# Patient Record
Sex: MALE | Race: WHITE | Employment: FULL TIME | URBAN - METROPOLITAN AREA
[De-identification: names, ages, dates, MRNs, and addresses within clinical notes are randomized per-mention and may not be internally consistent; named-entity substitution may affect disease eponyms.]

---

## 2018-10-18 ENCOUNTER — HOSPITAL ENCOUNTER (EMERGENCY)
Age: 23
Discharge: HOME | End: 2018-10-18
Payer: COMMERCIAL

## 2018-10-18 VITALS
DIASTOLIC BLOOD PRESSURE: 82 MMHG | SYSTOLIC BLOOD PRESSURE: 118 MMHG | HEART RATE: 112 BPM | OXYGEN SATURATION: 96 % | RESPIRATION RATE: 16 BRPM | BODY MASS INDEX: 31.34 KG/M2 | TEMPERATURE: 98.3 F | HEIGHT: 66 IN | WEIGHT: 195 LBS

## 2018-10-18 DIAGNOSIS — J01.00 ACUTE NON-RECURRENT MAXILLARY SINUSITIS: Primary | ICD-10-CM

## 2018-10-18 DIAGNOSIS — H10.32 ACUTE BACTERIAL CONJUNCTIVITIS OF LEFT EYE: ICD-10-CM

## 2018-10-18 PROCEDURE — 99282 EMERGENCY DEPT VISIT SF MDM: CPT

## 2018-10-18 RX ORDER — BENZONATATE 100 MG/1
100 CAPSULE ORAL 3 TIMES DAILY PRN
Qty: 21 CAPSULE | Refills: 0 | Status: SHIPPED | OUTPATIENT
Start: 2018-10-18 | End: 2018-10-25

## 2018-10-18 RX ORDER — MULTIVIT WITH MINERALS/LUTEIN
250 TABLET ORAL DAILY
COMMUNITY
End: 2019-04-06 | Stop reason: ALTCHOICE

## 2018-10-18 RX ORDER — DOXYCYCLINE HYCLATE 100 MG
100 TABLET ORAL 2 TIMES DAILY
Qty: 20 TABLET | Refills: 0 | Status: SHIPPED | OUTPATIENT
Start: 2018-10-18 | End: 2018-10-28

## 2018-10-18 RX ORDER — GUAIFENESIN 400 MG/1
400 TABLET ORAL 4 TIMES DAILY PRN
COMMUNITY
End: 2019-04-06 | Stop reason: ALTCHOICE

## 2018-10-18 RX ORDER — POLYMYXIN B SULFATE AND TRIMETHOPRIM 1; 10000 MG/ML; [USP'U]/ML
1 SOLUTION OPHTHALMIC EVERY 4 HOURS
Qty: 1 BOTTLE | Refills: 0 | Status: SHIPPED | OUTPATIENT
Start: 2018-10-18 | End: 2018-10-28

## 2018-10-18 ASSESSMENT — PAIN DESCRIPTION - DESCRIPTORS: DESCRIPTORS: ACHING

## 2018-10-18 ASSESSMENT — PAIN SCALES - GENERAL: PAINLEVEL_OUTOF10: 5

## 2018-10-18 ASSESSMENT — PAIN DESCRIPTION - PAIN TYPE: TYPE: ACUTE PAIN

## 2018-10-18 ASSESSMENT — PAIN DESCRIPTION - FREQUENCY: FREQUENCY: CONTINUOUS

## 2019-04-06 ENCOUNTER — HOSPITAL ENCOUNTER (EMERGENCY)
Age: 24
Discharge: HOME | End: 2019-04-07
Attending: EMERGENCY MEDICINE
Payer: COMMERCIAL

## 2019-04-06 ENCOUNTER — APPOINTMENT (OUTPATIENT)
Dept: GENERAL RADIOLOGY | Age: 24
End: 2019-04-06
Payer: COMMERCIAL

## 2019-04-06 ENCOUNTER — HOSPITAL ENCOUNTER (OUTPATIENT)
Age: 24
Discharge: HOME | End: 2019-04-06
Payer: COMMERCIAL

## 2019-04-06 DIAGNOSIS — S82.832A OTHER CLOSED FRACTURE OF DISTAL END OF LEFT FIBULA, INITIAL ENCOUNTER: Primary | ICD-10-CM

## 2019-04-06 PROCEDURE — A0428 BLS: HCPCS

## 2019-04-06 PROCEDURE — 73610 X-RAY EXAM OF ANKLE: CPT

## 2019-04-06 PROCEDURE — A0425 GROUND MILEAGE: HCPCS

## 2019-04-06 PROCEDURE — 6370000000 HC RX 637 (ALT 250 FOR IP): Performed by: NURSE PRACTITIONER

## 2019-04-06 PROCEDURE — 99285 EMERGENCY DEPT VISIT HI MDM: CPT

## 2019-04-06 PROCEDURE — 29515 APPLICATION SHORT LEG SPLINT: CPT

## 2019-04-06 PROCEDURE — 73590 X-RAY EXAM OF LOWER LEG: CPT

## 2019-04-06 RX ORDER — IBUPROFEN 800 MG/1
800 TABLET ORAL ONCE
Status: COMPLETED | OUTPATIENT
Start: 2019-04-06 | End: 2019-04-06

## 2019-04-06 RX ORDER — OXYCODONE HYDROCHLORIDE AND ACETAMINOPHEN 5; 325 MG/1; MG/1
1 TABLET ORAL ONCE
Status: COMPLETED | OUTPATIENT
Start: 2019-04-06 | End: 2019-04-06

## 2019-04-06 RX ORDER — LIDOCAINE HYDROCHLORIDE 10 MG/ML
20 INJECTION, SOLUTION EPIDURAL; INFILTRATION; INTRACAUDAL; PERINEURAL SEE ADMIN INSTRUCTIONS
Status: DISCONTINUED | OUTPATIENT
Start: 2019-04-06 | End: 2019-04-07 | Stop reason: HOSPADM

## 2019-04-06 RX ADMIN — IBUPROFEN 800 MG: 800 TABLET, FILM COATED ORAL at 20:31

## 2019-04-06 RX ADMIN — OXYCODONE HYDROCHLORIDE AND ACETAMINOPHEN 1 TABLET: 5; 325 TABLET ORAL at 20:31

## 2019-04-06 ASSESSMENT — PAIN DESCRIPTION - LOCATION
LOCATION: ANKLE
LOCATION: LEG

## 2019-04-06 ASSESSMENT — PAIN DESCRIPTION - DESCRIPTORS
DESCRIPTORS: DISCOMFORT
DESCRIPTORS: DISCOMFORT

## 2019-04-06 ASSESSMENT — PAIN DESCRIPTION - ORIENTATION
ORIENTATION: LEFT
ORIENTATION: LEFT

## 2019-04-06 ASSESSMENT — PAIN DESCRIPTION - PAIN TYPE: TYPE: ACUTE PAIN

## 2019-04-06 ASSESSMENT — PAIN SCALES - GENERAL
PAINLEVEL_OUTOF10: 10
PAINLEVEL_OUTOF10: 2
PAINLEVEL_OUTOF10: 10

## 2019-04-06 NOTE — LETTER
[address] [facility] Emergency Department  [address] [address]  Phone: [phone]             April 7, 2019    Patient: Liv Coles   YOB: 1995   Date of Visit: 4/6/2019       To Whom It May Concern: Liv Coles was seen and treated in our emergency department on 4/6/2019. He may return to Work on the following date 4/8/19 with light duty. He may return to full duty once he is cleared by orthopedics.     Sincerely,       Sonido Andrade RN         Signature:__________________________________

## 2019-04-07 ENCOUNTER — APPOINTMENT (OUTPATIENT)
Dept: GENERAL RADIOLOGY | Age: 24
End: 2019-04-07
Payer: COMMERCIAL

## 2019-04-07 VITALS
HEIGHT: 66 IN | TEMPERATURE: 97.9 F | OXYGEN SATURATION: 100 % | BODY MASS INDEX: 32.14 KG/M2 | HEART RATE: 80 BPM | DIASTOLIC BLOOD PRESSURE: 80 MMHG | WEIGHT: 200 LBS | SYSTOLIC BLOOD PRESSURE: 136 MMHG | RESPIRATION RATE: 15 BRPM

## 2019-04-07 PROCEDURE — 6370000000 HC RX 637 (ALT 250 FOR IP): Performed by: STUDENT IN AN ORGANIZED HEALTH CARE EDUCATION/TRAINING PROGRAM

## 2019-04-07 PROCEDURE — 73610 X-RAY EXAM OF ANKLE: CPT

## 2019-04-07 RX ORDER — OXYCODONE HYDROCHLORIDE AND ACETAMINOPHEN 5; 325 MG/1; MG/1
1 TABLET ORAL EVERY 6 HOURS PRN
Status: DISCONTINUED | OUTPATIENT
Start: 2019-04-07 | End: 2019-04-07 | Stop reason: HOSPADM

## 2019-04-07 RX ORDER — HYDROCODONE BITARTRATE AND ACETAMINOPHEN 5; 325 MG/1; MG/1
1 TABLET ORAL EVERY 4 HOURS PRN
Qty: 18 TABLET | Refills: 0 | Status: SHIPPED | OUTPATIENT
Start: 2019-04-07 | End: 2019-04-10

## 2019-04-07 RX ADMIN — OXYCODONE AND ACETAMINOPHEN 1 TABLET: 5; 325 TABLET ORAL at 03:27
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
## 2019-04-07 NOTE — ED NOTES
Attempted x 2 to call report to [facility] ED- placed on hold for extended periods of time with no answer by RN to give report to     Juan R Dumont RN  04/06/19 2572

## 2019-04-07 NOTE — ED NOTES
Pt alert and oriented x4. Speech clear. Respirations easy/unlabored. Skin warm/dry. Appropriate color. No signs of acute distress noted. Pt/family teaching provided; verbalized understanding. Pt stable for discharge.      Bhumi Lancaster RN  04/07/19 4703

## 2019-04-07 NOTE — ED PROVIDER NOTES
rebound, guarding, or rigidity. No pulsatile masses appreciated. Musculoskeletal: Moves all extremities x 4. Splint noted to left ankle  Neurologic: GCS 15, CN 2-12 grossly intact, no focal deficits, symmetric strength 5/5 in the upper and lower extremities bilaterally  Psych: Normal Affect    -------------------------------------------------- RESULTS -------------------------------------------------  I have personally reviewed all laboratory and imaging results for this patient. Results are listed below. LABS:  No results found for this visit on 04/06/19. RADIOLOGY:  Interpreted by Radiologist.  XR ANKLE LEFT (MIN 3 VIEWS)   Final Result   Acute distal fibular fracture. XR TIBIA FIBULA LEFT (2 VIEWS)   Final Result   Acute distal fibular fracture. EKG Interpretation        ------------------------- NURSING NOTES AND VITALS REVIEWED ---------------------------   The nursing notes within the ED encounter and vital signs as below have been reviewed by myself. BP (!) 154/90   Pulse 92   Temp 98.4 °F (36.9 °C)   Resp 16   Ht 5' 6\" (1.676 m)   Wt 200 lb (90.7 kg)   SpO2 95%   BMI 32.28 kg/m²   Oxygen Saturation Interpretation: Normal    The patients available past medical records and past encounters were reviewed. ------------------------------ ED COURSE/MEDICAL DECISION MAKING----------------------  Medications   nicotine (NICODERM CQ) 7 MG/24HR 1 patch (1 patch Transdermal Patch Applied 4/6/19 2146)   ibuprofen (ADVIL;MOTRIN) tablet 800 mg (800 mg Oral Given 4/6/19 2031)   oxyCODONE-acetaminophen (PERCOCET) 5-325 MG per tablet 1 tablet (1 tablet Oral Given 4/6/19 2031)             Medical Decision Making:        Re-Evaluations:             Re-evaluation. Patients symptoms show no change      Consultations:             ortho    Critical Care:          This patient's ED course included: a personal history and physicial eaxmination    This patient has been closely monitored during their ED course. Counseling: The emergency provider has spoken with the patient and discussed todays results, in addition to providing specific details for the plan of care and counseling regarding the diagnosis and prognosis. Questions are answered at this time and they are agreeable with the plan.       --------------------------------- IMPRESSION AND DISPOSITION ---------------------------------    IMPRESSION  1. Other closed fracture of distal end of left fibula, initial encounter        DISPOSITION  Disposition: Discharge to home  Patient condition is stable        NOTE: This report was transcribed using voice recognition software.  Every effort was made to ensure accuracy; however, inadvertent computerized transcription errors may be present         Kirstin Albert MD  04/06/19 100 Patria Sandra MD  04/07/19 5711
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
## 2019-04-09 ENCOUNTER — TELEPHONE (OUTPATIENT)
Dept: ADMINISTRATIVE | Age: 24
End: 2019-04-09

## 2019-04-09 DIAGNOSIS — S82.832A CLOSED FRACTURE OF DISTAL END OF LEFT FIBULA, UNSPECIFIED FRACTURE MORPHOLOGY, INITIAL ENCOUNTER: Primary | ICD-10-CM

## 2019-04-09 NOTE — TELEPHONE ENCOUNTER
Ed f/u appointment needed with Dr Vicente Mayes for closed fracture distal fibula. Call put through to [facility].

## 2019-04-09 NOTE — TELEPHONE ENCOUNTER
Patient's dad called checking on status of appointment with Dr Matt Olivera for closed fracture of distal end of left fibula. Doris Mcnamara and she states that  hasn't reviewed it yet, he is in surgery and she will call when she can. Please call Arlene Powell back to schedule, his phone number is [phone]. Thank you.

## 2019-04-16 ENCOUNTER — OFFICE VISIT (OUTPATIENT)
Dept: ORTHOPEDIC SURGERY | Age: 24
End: 2019-04-16
Payer: COMMERCIAL

## 2019-04-16 ENCOUNTER — HOSPITAL ENCOUNTER (OUTPATIENT)
Dept: GENERAL RADIOLOGY | Age: 24
Discharge: HOME | End: 2019-04-18
Payer: COMMERCIAL

## 2019-04-16 VITALS
SYSTOLIC BLOOD PRESSURE: 119 MMHG | DIASTOLIC BLOOD PRESSURE: 81 MMHG | BODY MASS INDEX: 30.61 KG/M2 | HEART RATE: 73 BPM | HEIGHT: 67 IN | WEIGHT: 195 LBS

## 2019-04-16 DIAGNOSIS — S82.832A CLOSED FRACTURE OF DISTAL END OF LEFT FIBULA, UNSPECIFIED FRACTURE MORPHOLOGY, INITIAL ENCOUNTER: Primary | ICD-10-CM

## 2019-04-16 DIAGNOSIS — S82.832A CLOSED FRACTURE OF DISTAL END OF LEFT FIBULA, UNSPECIFIED FRACTURE MORPHOLOGY, INITIAL ENCOUNTER: ICD-10-CM

## 2019-04-16 PROCEDURE — 99204 OFFICE O/P NEW MOD 45 MIN: CPT | Performed by: PHYSICIAN ASSISTANT

## 2019-04-16 PROCEDURE — 99212 OFFICE O/P EST SF 10 MIN: CPT | Performed by: ORTHOPAEDIC SURGERY

## 2019-04-16 PROCEDURE — 4004F PT TOBACCO SCREEN RCVD TLK: CPT | Performed by: PHYSICIAN ASSISTANT

## 2019-04-16 PROCEDURE — G8417 CALC BMI ABV UP PARAM F/U: HCPCS | Performed by: PHYSICIAN ASSISTANT

## 2019-04-16 PROCEDURE — G8427 DOCREV CUR MEDS BY ELIG CLIN: HCPCS | Performed by: PHYSICIAN ASSISTANT

## 2019-04-16 PROCEDURE — 73610 X-RAY EXAM OF ANKLE: CPT

## 2019-04-16 RX ORDER — HYDROCODONE BITARTRATE AND ACETAMINOPHEN 5; 325 MG/1; MG/1
1 TABLET ORAL EVERY 6 HOURS PRN
COMMUNITY
End: 2019-04-16 | Stop reason: CLARIF

## 2019-04-16 RX ORDER — ASPIRIN 325 MG
325 TABLET, DELAYED RELEASE (ENTERIC COATED) ORAL 2 TIMES DAILY
Qty: 60 TABLET | Refills: 2 | Status: SHIPPED
Start: 2019-04-16 | End: 2022-03-03 | Stop reason: ALTCHOICE

## 2019-04-16 RX ORDER — OXYCODONE HYDROCHLORIDE AND ACETAMINOPHEN 5; 325 MG/1; MG/1
1 TABLET ORAL EVERY 6 HOURS PRN
COMMUNITY
End: 2019-04-17

## 2019-04-16 RX ORDER — HYDROCODONE BITARTRATE AND ACETAMINOPHEN 5; 325 MG/1; MG/1
1 TABLET ORAL EVERY 4 HOURS PRN
Qty: 42 TABLET | Refills: 0 | Status: SHIPPED | OUTPATIENT
Start: 2019-04-16 | End: 2019-04-23

## 2019-04-16 NOTE — PATIENT INSTRUCTIONS
1. Your surgery is scheduled for Left Ankle Open Reduction Internal Fixation 4/19/19 at Time TBD at the [facility]ending [facility] to [facility] in [facility] . You will need to report to Preop area  that morning at 1.5 hours prior to scheduled surgery time    2. You are having outpatient surgery so you will be returning home the same day  3. Preadmission Testing (PAT) department at [facility] will contact you with all the details prior to surgery. 4. Nothing to eat or drink after midnight the night before surgery. You may take a pain pill and any other medicine PAT instructs you to take with small sip of water if needed. 5. Keep splint clean and dry. Do not remove or get wet. 6. Continue with ice and elevation. 7. No weight bearing to left ankle use assistive devices  8. Take pain medicine as instructed-- refill called in today  9. Call office with any question or concerns: [phone].  Hold Aspirin the day of surgery

## 2019-04-16 NOTE — LETTER
[address]  [address] [place] [address]  Phone: [phone]  Fax: [address] [place], [place]        April 16, 2019     Patient: Wilner Medrano   YOB: 1995   Date of Visit: 4/16/2019       To Whom It May Concern: It is my medical opinion that Wilner Medrano should remain off work at this time for orthopedic intervention. If you have any questions or concerns, please don't hesitate to call.     Sincerely,        F?rsat Bu F?rsat, DO

## 2019-04-17 NOTE — PROGRESS NOTES
[address] PRE-ADMISSION TESTING GENERAL INSTRUCTIONS- [facility]-phone number:[phone]    GENERAL INSTRUCTIONS  [x] Antibacterial Soap shower Night before and/or AM of Surgery  [] Joshua wipe instruction sheet and wipes given. [x] Nothing by mouth after midnight, including gum, candy, mints, or water. [x] You may brush your teeth, gargle, but do NOT swallow water. []Hibiclens shower  the night before and the morning of surgery. Do not use             Hibiclens on your face or head. [x]No smoking, chewing tobacco, illegal drugs, or alcohol within 24 hours of your surgery. [x] Jewelry, valuables or body piercing's should not be brought to the hospital. All body and/or tongue piercing's must be removed prior to arriving to hospital.  ALL hair pins must be removed. [x] Do not wear makeup, lotions, powders, deodorant. Nail polish as directed by the nurse. [x] Arrange transportation with a responsible adult  to and from the hospital. If you do not have a responsible adult  to transport you, you will need to make arrangements with a medical transportation company (i.e. [employer]. A Uber/taxi/bus is not appropriate unless you are accompanied by a responsible adult ). Arrange for someone to be with you for the remainder of the day and for 24 hours after your procedure due to having had anesthesia. Who will be your  for transportation?______FATHER, TOM___________   Who will be staying with you for 24 hrs after your procedure? __PARENT________________  [x] Bring insurance card and photo ID.  [] Transfusion Bracelet: Please bring with you to hospital, day of surgery  [] Bring urine specimen day of surgery. Any small container is acceptable. [] Use inhalers the morning of surgery and bring with you to hospital.  [] Bring copy of living will or healthcare power of  papers to be placed in your electronic record.   [] CPAP/BI-PAP: Please bring your machine if you are to spend the night in the hospital.     PARKING INSTRUCTIONS:   [x] Arrival Time:__0900___________  · [x] Parking lot '\"I\"  is located on [facility] (the corner of [facility] and [facility]). To enter, press the button and the gate will lift. A free token will be provided to exit the lot. One car per patient is allowed to park in this lot. All other cars are to park on [address] [place] [address] either in the parking garage or the handicap lot. [] Free  parking is available on [address] [place] [address]. · [] To reach the [facility] lobby from [address] [place] [address], upon entering the hospital, take elevator B to the 3rd floor. EDUCATION INSTRUCTIONS:      [] Knee or hip replacement booklet & exercise pamphlets given. [x] [address] placed in chart. [] Pre-admission Testing educational folder given  [] Incentive Spirometry,coughing & deep breathing exercises reviewed. []Medication information sheet(s)   [x]Fluoroscopy-Xray used in surgery reviewed with patient. Educational pamphlet placed in chart. [x]Pain: Post-op pain is normal and to be expected. You will be asked to rate your pain from 0-10(a zero is not acceptable-education is needed). Your post-op pain goal is:5  [x] Ask your nurse for your pain medication. [] Joint camp offered. [] Joint replacement booklets given. [] Other:___________________________    MEDICATION INSTRUCTIONS:   []Bring a complete list of your medications, please write the last time you took the medicine, give this list to the nurse.   [] Take the following medications the morning of surgery with 1-2 ounces of water:   [] Stop herbal supplements and vitamins 5 days before your surgery. [] DO NOT take any diabetic medicine the morning of surgery. Follow instructions for insulin the day before surgery.   [] If you are diabetic and your blood sugar is low or you feel symptomatic, you may drink 1-2 ounces of apple juice or take a glucose tablet. The morning of your procedure, you may call the pre-op area if you have concerns about your blood sugar [phone]. [] Use your inhalers the morning of surgery. Bring your emergency inhaler with you day of surgery. [x] Follow physician instructions regarding any blood thinners you may be taking. HE WAS TOLD TO NOT TAKE DOS. WHAT TO EXPECT:  [x] The day of surgery you will be greeted and checked in by the [employer].  In addition, you will be registered in the [facility] by a Patient Access Representative. Please bring your photo ID and insurance card. A nurse will greet you in accordance to the time you are needed in the pre-op area to prepare you for surgery. Please do not be discouraged if you are not greeted in the order you arrive as there are many variables that are involved in patient preparation. Your patience is greatly appreciated as you wait for your nurse. Please bring in items such as: books, magazines, newspapers, electronics, or any other items  to occupy your time in the waiting area. []  Delays may occur with surgery and staff will make a sincere effort to keep you informed of delays. If any delays occur with your procedure, we apologize ahead of time for your inconvenience as we recognize the value of your time.

## 2019-04-18 ENCOUNTER — PREP FOR PROCEDURE (OUTPATIENT)
Dept: ORTHOPEDIC SURGERY | Age: 24
End: 2019-04-18

## 2019-04-18 RX ORDER — CLINDAMYCIN PHOSPHATE 900 MG/50ML
900 INJECTION INTRAVENOUS
Status: CANCELLED | OUTPATIENT
Start: 2019-04-18 | End: 2019-04-18

## 2019-04-18 RX ORDER — SODIUM CHLORIDE 0.9 % (FLUSH) 0.9 %
10 SYRINGE (ML) INJECTION PRN
Status: CANCELLED | OUTPATIENT
Start: 2019-04-18

## 2019-04-18 RX ORDER — SODIUM CHLORIDE 0.9 % (FLUSH) 0.9 %
10 SYRINGE (ML) INJECTION EVERY 12 HOURS SCHEDULED
Status: CANCELLED | OUTPATIENT
Start: 2019-04-18

## 2019-04-18 RX ORDER — SODIUM CHLORIDE, SODIUM LACTATE, POTASSIUM CHLORIDE, CALCIUM CHLORIDE 600; 310; 30; 20 MG/100ML; MG/100ML; MG/100ML; MG/100ML
INJECTION, SOLUTION INTRAVENOUS CONTINUOUS
Status: CANCELLED | OUTPATIENT
Start: 2019-04-18
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
## 2019-04-19 ENCOUNTER — ANESTHESIA EVENT (OUTPATIENT)
Dept: OPERATING ROOM | Age: 24
End: 2019-04-19
Payer: COMMERCIAL

## 2019-04-19 ENCOUNTER — HOSPITAL ENCOUNTER (OUTPATIENT)
Age: 24
Setting detail: OUTPATIENT SURGERY
Discharge: HOME | End: 2019-04-19
Attending: ORTHOPAEDIC SURGERY | Admitting: ORTHOPAEDIC SURGERY
Payer: COMMERCIAL

## 2019-04-19 ENCOUNTER — APPOINTMENT (OUTPATIENT)
Dept: GENERAL RADIOLOGY | Age: 24
End: 2019-04-19
Attending: ORTHOPAEDIC SURGERY
Payer: COMMERCIAL

## 2019-04-19 ENCOUNTER — ANESTHESIA (OUTPATIENT)
Dept: OPERATING ROOM | Age: 24
End: 2019-04-19
Payer: COMMERCIAL

## 2019-04-19 VITALS
BODY MASS INDEX: 30.61 KG/M2 | DIASTOLIC BLOOD PRESSURE: 88 MMHG | SYSTOLIC BLOOD PRESSURE: 132 MMHG | OXYGEN SATURATION: 99 % | HEART RATE: 88 BPM | RESPIRATION RATE: 16 BRPM | TEMPERATURE: 97 F | HEIGHT: 67 IN | WEIGHT: 195 LBS

## 2019-04-19 VITALS
DIASTOLIC BLOOD PRESSURE: 54 MMHG | TEMPERATURE: 94.5 F | RESPIRATION RATE: 9 BRPM | SYSTOLIC BLOOD PRESSURE: 106 MMHG | OXYGEN SATURATION: 99 %

## 2019-04-19 PROCEDURE — 3600000005 HC SURGERY LEVEL 5 BASE: Performed by: ORTHOPAEDIC SURGERY

## 2019-04-19 PROCEDURE — 77071 MNL APPL STRS JT RADIOGRAPHY: CPT | Performed by: ORTHOPAEDIC SURGERY

## 2019-04-19 PROCEDURE — 3700000001 HC ADD 15 MINUTES (ANESTHESIA): Performed by: ORTHOPAEDIC SURGERY

## 2019-04-19 PROCEDURE — 73610 X-RAY EXAM OF ANKLE: CPT

## 2019-04-19 PROCEDURE — 64447 NJX AA&/STRD FEMORAL NRV IMG: CPT | Performed by: ANESTHESIOLOGY

## 2019-04-19 PROCEDURE — 27792 TREATMENT OF ANKLE FRACTURE: CPT | Performed by: ORTHOPAEDIC SURGERY

## 2019-04-19 PROCEDURE — 7100000001 HC PACU RECOVERY - ADDTL 15 MIN: Performed by: ORTHOPAEDIC SURGERY

## 2019-04-19 PROCEDURE — C1713 ANCHOR/SCREW BN/BN,TIS/BN: HCPCS | Performed by: ORTHOPAEDIC SURGERY

## 2019-04-19 PROCEDURE — 7100000010 HC PHASE II RECOVERY - FIRST 15 MIN: Performed by: ORTHOPAEDIC SURGERY

## 2019-04-19 PROCEDURE — 6360000002 HC RX W HCPCS: Performed by: ANESTHESIOLOGY

## 2019-04-19 PROCEDURE — 2500000003 HC RX 250 WO HCPCS: Performed by: PHYSICIAN ASSISTANT

## 2019-04-19 PROCEDURE — 3700000000 HC ANESTHESIA ATTENDED CARE: Performed by: ORTHOPAEDIC SURGERY

## 2019-04-19 PROCEDURE — 2709999900 HC NON-CHARGEABLE SUPPLY: Performed by: ORTHOPAEDIC SURGERY

## 2019-04-19 PROCEDURE — 2580000003 HC RX 258: Performed by: PHYSICIAN ASSISTANT

## 2019-04-19 PROCEDURE — 7100000011 HC PHASE II RECOVERY - ADDTL 15 MIN: Performed by: ORTHOPAEDIC SURGERY

## 2019-04-19 PROCEDURE — 3209999900 FLUORO FOR SURGICAL PROCEDURES

## 2019-04-19 PROCEDURE — 2500000003 HC RX 250 WO HCPCS

## 2019-04-19 PROCEDURE — 2720000010 HC SURG SUPPLY STERILE: Performed by: ORTHOPAEDIC SURGERY

## 2019-04-19 PROCEDURE — 76942 ECHO GUIDE FOR BIOPSY: CPT | Performed by: ANESTHESIOLOGY

## 2019-04-19 PROCEDURE — 3600000015 HC SURGERY LEVEL 5 ADDTL 15MIN: Performed by: ORTHOPAEDIC SURGERY

## 2019-04-19 PROCEDURE — 6360000002 HC RX W HCPCS

## 2019-04-19 PROCEDURE — 7100000000 HC PACU RECOVERY - FIRST 15 MIN: Performed by: ORTHOPAEDIC SURGERY

## 2019-04-19 DEVICE — SCREW BNE L18MM DIA4MM CANC S STL ST CANN NONLOCKING FULL: Type: IMPLANTABLE DEVICE | Site: ANKLE | Status: FUNCTIONAL

## 2019-04-19 DEVICE — SCREW BNE L12MM DIA4MM CANC S STL ST CANN NONLOCKING FULL: Type: IMPLANTABLE DEVICE | Site: ANKLE | Status: FUNCTIONAL

## 2019-04-19 DEVICE — PLATE BNE L117MM 10 H S STL 1/3 TBLR LOK COMPR W/ CLLR FOR: Type: IMPLANTABLE DEVICE | Site: ANKLE | Status: FUNCTIONAL

## 2019-04-19 DEVICE — SCREW BNE L12MM DIA3.5MM CORT S STL ST NONCANNULATED LOK: Type: IMPLANTABLE DEVICE | Site: ANKLE | Status: FUNCTIONAL

## 2019-04-19 DEVICE — SCREW BNE L16MM DIA2.7MM CORT S STL ST FULL THRD FOR SM: Type: IMPLANTABLE DEVICE | Site: ANKLE | Status: FUNCTIONAL

## 2019-04-19 DEVICE — SCREW BNE L16MM DIA4MM CANC S STL ST CANN NONLOCKING FULL: Type: IMPLANTABLE DEVICE | Site: ANKLE | Status: FUNCTIONAL

## 2019-04-19 RX ORDER — SODIUM CHLORIDE 0.9 % (FLUSH) 0.9 %
10 SYRINGE (ML) INJECTION PRN
Status: DISCONTINUED | OUTPATIENT
Start: 2019-04-19 | End: 2019-04-19 | Stop reason: HOSPADM

## 2019-04-19 RX ORDER — SODIUM CHLORIDE, SODIUM LACTATE, POTASSIUM CHLORIDE, CALCIUM CHLORIDE 600; 310; 30; 20 MG/100ML; MG/100ML; MG/100ML; MG/100ML
INJECTION, SOLUTION INTRAVENOUS CONTINUOUS
Status: DISCONTINUED | OUTPATIENT
Start: 2019-04-19 | End: 2019-04-19 | Stop reason: HOSPADM

## 2019-04-19 RX ORDER — ROPIVACAINE HYDROCHLORIDE 5 MG/ML
20 INJECTION, SOLUTION EPIDURAL; INFILTRATION; PERINEURAL ONCE
Status: COMPLETED | OUTPATIENT
Start: 2019-04-19 | End: 2019-04-19

## 2019-04-19 RX ORDER — MIDAZOLAM HYDROCHLORIDE 1 MG/ML
INJECTION INTRAMUSCULAR; INTRAVENOUS PRN
Status: DISCONTINUED | OUTPATIENT
Start: 2019-04-19 | End: 2019-04-19 | Stop reason: SDUPTHER

## 2019-04-19 RX ORDER — DEXAMETHASONE SODIUM PHOSPHATE 10 MG/ML
INJECTION INTRAMUSCULAR; INTRAVENOUS PRN
Status: DISCONTINUED | OUTPATIENT
Start: 2019-04-19 | End: 2019-04-19 | Stop reason: SDUPTHER

## 2019-04-19 RX ORDER — HYDROCODONE BITARTRATE AND ACETAMINOPHEN 5; 325 MG/1; MG/1
1 TABLET ORAL
Status: DISCONTINUED | OUTPATIENT
Start: 2019-04-19 | End: 2019-04-19 | Stop reason: HOSPADM

## 2019-04-19 RX ORDER — MIDAZOLAM HYDROCHLORIDE 1 MG/ML
1 INJECTION INTRAMUSCULAR; INTRAVENOUS EVERY 5 MIN PRN
Status: DISCONTINUED | OUTPATIENT
Start: 2019-04-19 | End: 2019-04-19 | Stop reason: HOSPADM

## 2019-04-19 RX ORDER — PROPOFOL 10 MG/ML
INJECTION, EMULSION INTRAVENOUS PRN
Status: DISCONTINUED | OUTPATIENT
Start: 2019-04-19 | End: 2019-04-19 | Stop reason: SDUPTHER

## 2019-04-19 RX ORDER — CLINDAMYCIN PHOSPHATE 900 MG/50ML
900 INJECTION INTRAVENOUS
Status: COMPLETED | OUTPATIENT
Start: 2019-04-19 | End: 2019-04-19

## 2019-04-19 RX ORDER — ROPIVACAINE HYDROCHLORIDE 5 MG/ML
30 INJECTION, SOLUTION EPIDURAL; INFILTRATION; PERINEURAL ONCE
Status: COMPLETED | OUTPATIENT
Start: 2019-04-19 | End: 2019-04-19

## 2019-04-19 RX ORDER — FENTANYL CITRATE 50 UG/ML
100 INJECTION, SOLUTION INTRAMUSCULAR; INTRAVENOUS ONCE
Status: COMPLETED | OUTPATIENT
Start: 2019-04-19 | End: 2019-04-19

## 2019-04-19 RX ORDER — SODIUM CHLORIDE 0.9 % (FLUSH) 0.9 %
10 SYRINGE (ML) INJECTION EVERY 12 HOURS SCHEDULED
Status: DISCONTINUED | OUTPATIENT
Start: 2019-04-19 | End: 2019-04-19 | Stop reason: HOSPADM

## 2019-04-19 RX ORDER — ONDANSETRON 2 MG/ML
INJECTION INTRAMUSCULAR; INTRAVENOUS PRN
Status: DISCONTINUED | OUTPATIENT
Start: 2019-04-19 | End: 2019-04-19 | Stop reason: SDUPTHER

## 2019-04-19 RX ORDER — KETOROLAC TROMETHAMINE 30 MG/ML
INJECTION, SOLUTION INTRAMUSCULAR; INTRAVENOUS PRN
Status: DISCONTINUED | OUTPATIENT
Start: 2019-04-19 | End: 2019-04-19 | Stop reason: SDUPTHER

## 2019-04-19 RX ORDER — FENTANYL CITRATE 50 UG/ML
INJECTION, SOLUTION INTRAMUSCULAR; INTRAVENOUS PRN
Status: DISCONTINUED | OUTPATIENT
Start: 2019-04-19 | End: 2019-04-19 | Stop reason: SDUPTHER

## 2019-04-19 RX ORDER — ROCURONIUM BROMIDE 10 MG/ML
INJECTION, SOLUTION INTRAVENOUS PRN
Status: DISCONTINUED | OUTPATIENT
Start: 2019-04-19 | End: 2019-04-19 | Stop reason: SDUPTHER

## 2019-04-19 RX ADMIN — MIDAZOLAM 2 MG: 1 INJECTION INTRAMUSCULAR; INTRAVENOUS at 10:42

## 2019-04-19 RX ADMIN — SODIUM CHLORIDE, POTASSIUM CHLORIDE, SODIUM LACTATE AND CALCIUM CHLORIDE: 600; 310; 30; 20 INJECTION, SOLUTION INTRAVENOUS at 09:59

## 2019-04-19 RX ADMIN — SODIUM CHLORIDE, POTASSIUM CHLORIDE, SODIUM LACTATE AND CALCIUM CHLORIDE: 600; 310; 30; 20 INJECTION, SOLUTION INTRAVENOUS at 12:30

## 2019-04-19 RX ADMIN — KETOROLAC TROMETHAMINE 30 MG: 30 INJECTION, SOLUTION INTRAMUSCULAR; INTRAVENOUS at 12:21

## 2019-04-19 RX ADMIN — ROPIVACAINE HYDROCHLORIDE 30 ML: 5 INJECTION, SOLUTION EPIDURAL; INFILTRATION; PERINEURAL at 10:42

## 2019-04-19 RX ADMIN — FENTANYL CITRATE 100 MCG: 50 INJECTION, SOLUTION INTRAMUSCULAR; INTRAVENOUS at 10:42

## 2019-04-19 RX ADMIN — FENTANYL CITRATE 100 MCG: 50 INJECTION, SOLUTION INTRAMUSCULAR; INTRAVENOUS at 11:31

## 2019-04-19 RX ADMIN — PROPOFOL 200 MG: 10 INJECTION, EMULSION INTRAVENOUS at 11:31

## 2019-04-19 RX ADMIN — MIDAZOLAM HYDROCHLORIDE 2 MG: 1 INJECTION, SOLUTION INTRAMUSCULAR; INTRAVENOUS at 11:27

## 2019-04-19 RX ADMIN — CLINDAMYCIN PHOSPHATE 900 MG: 900 INJECTION, SOLUTION INTRAVENOUS at 11:30

## 2019-04-19 RX ADMIN — ROPIVACAINE HYDROCHLORIDE 20 ML: 5 INJECTION EPIDURAL; INFILTRATION; PERINEURAL at 10:42

## 2019-04-19 RX ADMIN — DEXAMETHASONE SODIUM PHOSPHATE 10 MG: 10 INJECTION INTRAMUSCULAR; INTRAVENOUS at 11:31

## 2019-04-19 RX ADMIN — FENTANYL CITRATE 100 MCG: 50 INJECTION, SOLUTION INTRAMUSCULAR; INTRAVENOUS at 11:35

## 2019-04-19 RX ADMIN — ROCURONIUM BROMIDE 10 MG: 10 INJECTION, SOLUTION INTRAVENOUS at 11:50

## 2019-04-19 RX ADMIN — ONDANSETRON HYDROCHLORIDE 4 MG: 2 INJECTION, SOLUTION INTRAMUSCULAR; INTRAVENOUS at 12:20

## 2019-04-19 ASSESSMENT — PULMONARY FUNCTION TESTS
PIF_VALUE: 21
PIF_VALUE: 1
PIF_VALUE: 22
PIF_VALUE: 21
PIF_VALUE: 21
PIF_VALUE: 22
PIF_VALUE: 21
PIF_VALUE: 22
PIF_VALUE: 15
PIF_VALUE: 1
PIF_VALUE: 22
PIF_VALUE: 21
PIF_VALUE: 22
PIF_VALUE: 3
PIF_VALUE: 22
PIF_VALUE: 22
PIF_VALUE: 15
PIF_VALUE: 16
PIF_VALUE: 20
PIF_VALUE: 22
PIF_VALUE: 19
PIF_VALUE: 22
PIF_VALUE: 15
PIF_VALUE: 22
PIF_VALUE: 15
PIF_VALUE: 21
PIF_VALUE: 5
PIF_VALUE: 21
PIF_VALUE: 22
PIF_VALUE: 21
PIF_VALUE: 22
PIF_VALUE: 21
PIF_VALUE: 22
PIF_VALUE: 1
PIF_VALUE: 21
PIF_VALUE: 22
PIF_VALUE: 15
PIF_VALUE: 21
PIF_VALUE: 22
PIF_VALUE: 22
PIF_VALUE: 21
PIF_VALUE: 15
PIF_VALUE: 22
PIF_VALUE: 0
PIF_VALUE: 22
PIF_VALUE: 21
PIF_VALUE: 21
PIF_VALUE: 19
PIF_VALUE: 16
PIF_VALUE: 2
PIF_VALUE: 20
PIF_VALUE: 21
PIF_VALUE: 22
PIF_VALUE: 21
PIF_VALUE: 22
PIF_VALUE: 20
PIF_VALUE: 22
PIF_VALUE: 21
PIF_VALUE: 2
PIF_VALUE: 15
PIF_VALUE: 21
PIF_VALUE: 22
PIF_VALUE: 21
PIF_VALUE: 15
PIF_VALUE: 22
PIF_VALUE: 0
PIF_VALUE: 1
PIF_VALUE: 23
PIF_VALUE: 22

## 2019-04-19 ASSESSMENT — PAIN SCALES - GENERAL
PAINLEVEL_OUTOF10: 0

## 2019-04-19 ASSESSMENT — PAIN - FUNCTIONAL ASSESSMENT: PAIN_FUNCTIONAL_ASSESSMENT: 0-10
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
## 2019-04-19 NOTE — ANESTHESIA PROCEDURE NOTES
Peripheral Block    Patient location during procedure: procedure area  Start time: 4/19/2019 10:26 AM  End time: 4/19/2019 10:22 AM  Staffing  Anesthesiologist: Yaakov Barrera MD  Other anesthesia staff: Otilia Fonscea RN  Performed: other anesthesia staff and anesthesiologist   Preanesthetic Checklist  Completed: patient identified, site marked, surgical consent, pre-op evaluation, timeout performed, IV checked, risks and benefits discussed, monitors and equipment checked, anesthesia consent given, oxygen available and patient being monitored  Peripheral Block  Patient position: supine  Prep: ChloraPrep  Patient monitoring: cardiac monitor, continuous pulse ox, continuous capnometry, frequent blood pressure checks and IV access  Block type: Sciatic  Laterality: left  Injection technique: single-shot  Procedures: ultrasound guided and nerve stimulator  Local infiltration: ropivacaine  Infiltration strength: 0.5 %  Dose: 25 mL  Popliteal  Provider prep: sterile gloves and mask  Local infiltration: ropivacaine  Needle  Needle type: combined needle/nerve stimulator   Needle gauge: 22 G  Needle length: 5 cm  Needle localization: nerve stimulator and ultrasound guidance  Assessment  Injection assessment: negative aspiration for heme, no paresthesia on injection and local visualized surrounding nerve on ultrasound  Paresthesia pain: none  Slow fractionated injection: yes  Hemodynamics: stable  Reason for block: procedure for pain, post-op pain management and at surgeon's request

## 2019-04-19 NOTE — PROGRESS NOTES
Admitted to pre op, oriented to unit, denies any pain at present ace splint to left leg, toes warm.  Ambulates with crutches

## 2019-04-19 NOTE — ANESTHESIA PROCEDURE NOTES
Peripheral Block    Patient location during procedure: procedure area  Start time: 4/19/2019 10:27 AM  End time: 4/19/2019 10:32 AM  Staffing  Anesthesiologist: Robbin Cardona MD  Performed: anesthesiologist   Preanesthetic Checklist  Completed: patient identified, site marked, surgical consent, pre-op evaluation, timeout performed, IV checked, risks and benefits discussed, monitors and equipment checked, anesthesia consent given, oxygen available and patient being monitored  Peripheral Block  Patient position: supine  Prep: ChloraPrep  Patient monitoring: cardiac monitor, continuous pulse ox, continuous capnometry, frequent blood pressure checks and IV access  Block type: Saphenous  Laterality: left  Injection technique: single-shot  Procedures: ultrasound guided  Local infiltration: ropivacaine  Infiltration strength: 0.5 %  Dose: 15 mL  Provider prep: mask and sterile gloves  Local infiltration: ropivacaine  Needle  Needle type: combined needle/nerve stimulator   Needle gauge: 22 G  Needle length: 5 cm  Needle localization: ultrasound guidance  Assessment  Injection assessment: negative aspiration for heme, no paresthesia on injection and local visualized surrounding nerve on ultrasound  Paresthesia pain: none  Slow fractionated injection: yes  Hemodynamics: stable  Reason for block: procedure for pain, post-op pain management and at surgeon's request

## 2019-04-19 NOTE — BRIEF OP NOTE
Brief Postoperative Note  ______________________________________________________________    Patient: Tiffany Long  YOB: 1995  MRN: [id number]  Date of Procedure: 4/19/2019    Pre-Op Diagnosis: LEFT DISTAL FIBULA FRACTURE, BIMALLEOLAR EQUIVALENT    Post-Op Diagnosis: Same       Procedure(s):  LEFT ANKLE OPEN REDUCTION INTERNAL FIXATION     Anesthesia: Regional, General    Surgeon(s):  Daniele Crawford DO    Assistant: Corinne Quezada    Estimated Blood Loss (mL): less than 50     Complications: None    Specimens:   * No specimens in log *    Implants:  * No implants in log *      Srinivasan Locke DO  Date: 4/19/2019  Time: 12:44 PM

## 2019-04-20 NOTE — OP NOTE
[address]                  [address] [facility], [address] [place] [address]                                OPERATIVE REPORT    PATIENT NAME: Mitesh Liao                     :        1995  MED REC NO:   [id number]                            ROOM:  ACCOUNT NO:   [de-identified]                           ADMIT DATE: 2019  PROVIDER:     Tarik Lin DO    DATE OF PROCEDURE:  2019    OPERATIVE SURGEON:  Tarik Lin DO    ASSISTANT:  Annamaria Gray DO    PREOPERATIVE DIAGNOSIS:  Left distal fibular fracture, bimalleolar  equivalent. POSTOPERATIVE DIAGNOSIS:  Left distal fibular fracture, bimalleolar  equivalent. TYPE OF PROCEDURE:  1. Left distal fibular fracture, open reduction internal fixation. 2.  Dynamic fluoroscopic evaluation, left ankle. ANESTHESIA:  General, preoperative regional block. ESTIMATED BLOOD LOSS:  Minimal.    COMPLICATIONS:  None. IMPLANT:  Synthes. INDICATIONS:  Left ankle distal fibular fracture, bimalleolar  equivalent. Injury discussed in detail. The patient elected for  surgical intervention. Risks and benefits of surgery outlined detail  with the patient ____. He verbalized understanding of all that was  discussed. All questions were addressed to his satisfaction. He  elected to proceed with the procedure as outlined. DESCRIPTION OF PROCEDURE:  The patient was brought to the operating  suite, placed on the operating table in the supine position. He  received general anesthetic by the department of anesthesia as well as 2  gm of Ancef intravenously. Left lower extremity was sterilely prepped  and draped out in the usual fashion using Chloraprep scrub. Surgical  time-out was performed per protocol by all members of the surgical team.  Leg was elevated and exsanguinated with an Esmarch. Tourniquet was set  at 275 mmHg pressure. Longitudinal incision was made over the distal  fibula laterally.   Skin incised with a scalpel. Electrocautery was  taken through the subcutaneous tissues. Full-thickness flaps were  created. Investing fascia was identified. Careful dissection was taken  to identify the traversing superficial peroneal nerve. This was  protected throughout the remainder of the case. Peroneal compartment  was bluntly retracted posteriorly. Subperiosteal elevation was then  taken on the lateral border of the fibula. The patient had very a long  oblique fracture of the distal fibula. Fracture site was exposed,  debrided off interposed soft tissue using sharp excision, curettes,  rongeurs and irrigation. Fracture now openly reduced with pointed  reduction clamps. Appropriate reduction was confirmed clinically and  fluoroscopically. This was now stabilized with a 2.7 mm lag screw in  standard AO technique in an anterior to posterior trajectory. Fracture  was now stabilized with one-third semitubular neutralization plate. This was contoured on the back table, positioned over the distal fibula. This was fixated with three bicortical screws proximally, three  unicortical screws distally. At this point in time, I felt appropriate  reduction and stabilization of the fibula. Final images were taken and  saved to the [facility] system. Dynamic fluoroscopic evaluation was  undertaken of left ankle. There was no evidence of syndesmotic  instability. Wounds were irrigated and surgical incisions were closed  in standard layered fashion. Well-padded three-sided short leg splint  was now applied with the ankle in neutral position. Tourniquet was let  down at this juncture. The patient was now extubated uneventfully,  transferred on to [facility] to the postanesthesia care unit in  stable condition. POSTOPERATIVE PLAN:  The patient will be discharged home today. This  was scheduled as an outpatient procedure. He will be on aspirin for DVT  prophylaxis.   Maintain his splint elevation, nonweightbearing of left  lower extremity. Follow up in orthopedic office in 2 weeks for repeat  evaluation.         Mane Easton DO    D: 04/19/2019 18:52:33       T: 04/19/2019 18:56:06     [email or web address]  Job#: [id number]     Doc#: [id number]    CC:

## 2019-05-01 DIAGNOSIS — S82.62XD CLOSED DISPLACED FRACTURE OF LATERAL MALLEOLUS OF LEFT FIBULA WITH ROUTINE HEALING, SUBSEQUENT ENCOUNTER: Primary | ICD-10-CM

## 2019-05-03 ENCOUNTER — OFFICE VISIT (OUTPATIENT)
Dept: ORTHOPEDIC SURGERY | Age: 24
End: 2019-05-03
Payer: COMMERCIAL

## 2019-05-03 ENCOUNTER — HOSPITAL ENCOUNTER (OUTPATIENT)
Dept: GENERAL RADIOLOGY | Age: 24
Discharge: HOME | End: 2019-05-05
Payer: COMMERCIAL

## 2019-05-03 ENCOUNTER — TELEPHONE (OUTPATIENT)
Dept: ORTHOPEDIC SURGERY | Age: 24
End: 2019-05-03

## 2019-05-03 VITALS
TEMPERATURE: 97.4 F | HEIGHT: 68 IN | SYSTOLIC BLOOD PRESSURE: 134 MMHG | BODY MASS INDEX: 30.01 KG/M2 | DIASTOLIC BLOOD PRESSURE: 88 MMHG | HEART RATE: 85 BPM | WEIGHT: 198 LBS

## 2019-05-03 DIAGNOSIS — S82.832A CLOSED FRACTURE OF DISTAL END OF LEFT FIBULA, UNSPECIFIED FRACTURE MORPHOLOGY, INITIAL ENCOUNTER: ICD-10-CM

## 2019-05-03 DIAGNOSIS — S82.62XD CLOSED DISPLACED FRACTURE OF LATERAL MALLEOLUS OF LEFT FIBULA WITH ROUTINE HEALING, SUBSEQUENT ENCOUNTER: Primary | ICD-10-CM

## 2019-05-03 PROCEDURE — 99024 POSTOP FOLLOW-UP VISIT: CPT | Performed by: PHYSICIAN ASSISTANT

## 2019-05-03 PROCEDURE — 99212 OFFICE O/P EST SF 10 MIN: CPT | Performed by: PHYSICIAN ASSISTANT

## 2019-05-03 PROCEDURE — 73610 X-RAY EXAM OF ANKLE: CPT

## 2019-05-03 RX ORDER — OYSTER SHELL CALCIUM WITH VITAMIN D 500; 200 MG/1; [IU]/1
1 TABLET, FILM COATED ORAL 2 TIMES DAILY
Qty: 60 TABLET | Refills: 1 | Status: SHIPPED
Start: 2019-05-03 | End: 2022-03-03 | Stop reason: ALTCHOICE

## 2019-05-03 RX ORDER — HYDROCODONE BITARTRATE AND ACETAMINOPHEN 5; 325 MG/1; MG/1
1 TABLET ORAL EVERY 4 HOURS PRN
Qty: 42 TABLET | Refills: 0 | Status: SHIPPED | OUTPATIENT
Start: 2019-05-03 | End: 2019-05-10

## 2019-05-03 RX ORDER — HYDROCODONE BITARTRATE AND ACETAMINOPHEN 5; 325 MG/1; MG/1
1 TABLET ORAL EVERY 4 HOURS PRN
COMMUNITY
End: 2019-05-03 | Stop reason: ALTCHOICE

## 2019-05-03 NOTE — TELEPHONE ENCOUNTER
Michelle Barksdale from [facility] called regarding prescription for Calcium Carbonate with Vitamin D 500/200. They can not find a calcium carbonate in that strength. She was hoping to change it to the oyster shell calcium that is available in 500/200. Phone [phone].

## 2019-05-03 NOTE — PROGRESS NOTES
OP: DATE OF PROCEDURE:  04/19/2019     OPERATIVE SURGEON:  Allen Aparicio DO    POSTOPERATIVE DIAGNOSIS:  Left distal fibular fracture, bimalleolar  equivalent.     TYPE OF PROCEDURE:  1. Left distal fibular fracture, open reduction internal fixation. 2.  Dynamic fluoroscopic evaluation, left ankle. Subjective: Aristeo Parsons is approximately 2 weeks out from the above-mentioned procedure. Has remained nonweightbearing, pain is controlled. Denies any new complaints or paresthesias on exam.  Denies shortness of breath, chest pain, or calf pain. Denies fevers or chills. Denies taking aspirin for DVT prophylaxis. Review of Systems -    General ROS: negative for - chills, fatigue, fever or night sweats  Respiratory ROS: no cough, shortness of breath, or wheezing  Cardiovascular ROS: no chest pain or dyspnea on exertion  Gastrointestinal ROS: no abdominal pain, nausea, vomiting, diarrhea, constipation,or black or bloody stools  Genitourinary: no hematuria, dysuria, or incontinence   Musculoskeletal ROS: negative for -back or neck pain or stiffness, also see HPI  Neurological ROS: no TIA or stroke symptoms       Objective:    General: Alert and oriented X 3, normocephalic atraumatic, external ears and eye normal, sclera clear, no acute distress, respirations easy and unlabored with no audible wheezes, skin warm and dry, speech and dress appropriate for noted age, affect euthymic. Extremity:  left Lower Extremity Exam:    Incision over left ankle, well approximate, ready for suture removal, no signs of infection: No erythema, warmth, or drainage appreciated. Skin intact . No erythema/induration/fluctuence present. mild swelling present. healing ecchymosis present. Tender to palpation over distal fibula. Demonstrates active ankle plantar/dorsiflexion/great toe extension.    Sensation intact to light touch in sural/deep peroneal/superficial peroneal/saphenous/posterior tibial nerve distributions to foot/ankle. Palpable dorsalis pedis and posterior tibialis pulses, cap refill brisk in toes, foot warm/perfused. Compartments supple throughout thigh and leg. Calves soft non tender. /88 (Site: Left Upper Arm, Position: Sitting, Cuff Size: Medium Adult)   Pulse 85   Temp 97.4 °F (36.3 °C) (Oral)   Ht 5' 8\" (1.727 m)   Wt 198 lb (89.8 kg)   BMI 30.11 kg/m²     XR:  X-rays of the left ankle demonstrate distal fibula fracture, good alignment, no interval healing.,  Hardware intact without evidence of hardware failure or lucency. Mortise view intact and symmetric. No acute bony abnormalities. Assessment:   Diagnosis Orders   1. Closed displaced fracture of lateral malleolus of left fibula with routine healing, subsequent encounter  HYDROcodone-acetaminophen (NORCO) 5-325 MG per tablet    calcium-vitamin D (OSCAL-500) 500-200 MG-UNIT per tablet    [address] [facility]   2.  Closed fracture of distal end of left fibula, unspecified fracture morphology, initial encounter         Plan:  Removed sutures  WB:  Non-weight bearing  Boot : On at all times, can remove for bathing and therapy  Home therapy ordered  DVT: Continue with Aspirin as ordered  Dressing: Can remove dressing in 1-2 days then open to air  Can shower in a couple days, NO Soaking or swimming  Follow up in 4 weeks with XR of the ankle  [place] called to pharmacy  Electronically signed by Neelam Garcia PA-C on 5/3/2019 at 2:04 PM

## 2019-06-03 ENCOUNTER — HOSPITAL ENCOUNTER (OUTPATIENT)
Dept: GENERAL RADIOLOGY | Age: 24
Discharge: HOME | End: 2019-06-05
Payer: COMMERCIAL

## 2019-06-03 ENCOUNTER — OFFICE VISIT (OUTPATIENT)
Dept: ORTHOPEDIC SURGERY | Age: 24
End: 2019-06-03
Payer: COMMERCIAL

## 2019-06-03 VITALS
WEIGHT: 195 LBS | SYSTOLIC BLOOD PRESSURE: 135 MMHG | BODY MASS INDEX: 28.88 KG/M2 | RESPIRATION RATE: 14 BRPM | DIASTOLIC BLOOD PRESSURE: 85 MMHG | HEIGHT: 69 IN | HEART RATE: 84 BPM

## 2019-06-03 DIAGNOSIS — S82.822D CLOSED TORUS FRACTURE OF DISTAL END OF LEFT FIBULA WITH ROUTINE HEALING, SUBSEQUENT ENCOUNTER: Primary | ICD-10-CM

## 2019-06-03 DIAGNOSIS — S82.822D CLOSED TORUS FRACTURE OF DISTAL END OF LEFT FIBULA WITH ROUTINE HEALING, SUBSEQUENT ENCOUNTER: ICD-10-CM

## 2019-06-03 PROCEDURE — 73610 X-RAY EXAM OF ANKLE: CPT

## 2019-06-03 PROCEDURE — 99024 POSTOP FOLLOW-UP VISIT: CPT | Performed by: PHYSICIAN ASSISTANT

## 2019-06-03 PROCEDURE — 99212 OFFICE O/P EST SF 10 MIN: CPT | Performed by: PHYSICIAN ASSISTANT

## 2019-06-03 NOTE — PROGRESS NOTES
OP: SURGEON:  Roselia Hernandez DO   DATE OF PROCEDURE:  04/19/2019  TYPE OF PROCEDURE:  1. Left distal fibular fracture, open reduction internal fixation. 2.  Dynamic fluoroscopic evaluation, left ankle. Subjective: Kate Rose is approximately 7 weeks follow-up from the above surgery. Patient is instructed to be nonweightbearing in his cam boot and use crutches but patient presents today partial weightbearing on his left lower extremity. He ambulates with assistive device, crutches and in his cam walking boot. Pain to extremity is none and is not taking pain medication. They denies numbness, tingling, weakness. Denies Calf pain. Patient continues to use DVT prophylaxis, ASA BID. Patient is not participating in therapy. Patient expresses concern regarding getting back to work. Patient states that his employer will not allow him to be back to work while in a cam boot even a full weightbearing., Patient is eager to progress his weightbearing status and wean out of the boot. He endorses that he is still using calcium and vitamin D, not always twice daily. Patient states he continues to use nicotine. Review of Systems -    General ROS: negative for - chills, fatigue, fever or night sweats  Respiratory ROS: no cough, shortness of breath, or wheezing  Cardiovascular ROS: no chest pain or dyspnea on exertion  Gastrointestinal ROS: no abdominal pain, nausea, vomiting, diarrhea, constipation,or black or bloody stools  Genitourinary: no hematuria, dysuria, or incontinence   Musculoskeletal ROS: negative for -back or neck pain or stiffness, also see HPI  Neurological ROS: no TIA or stroke symptoms       Objective:    General: Alert and oriented X 3, normocephalic atraumatic, external ears and eye normal, sclera clear, no acute distress, respirations easy and unlabored with no audible wheezes, skin warm and dry, speech and dress appropriate for noted age, affect euthymic.     Extremity:  Left Lower Extremity  Skin clean status immediately. Patient is educated that his fracture is not fully healed and advancing his weightbearing status could increase his pain as well as results in failure of his fixation construct. Patient expressed understanding of this plan and agreed to monitor for pain and adjust his weightbearing status appropriately. Patient will call when a RTW letter is needed  Patient to follow up in 6 weeks    Electronically signed by Steve Simms PA-C on 6/3/2019 at 10:33 AM  Note: This report was completed using computerExtenda-Dent voiced recognition software. Every effort has been made to ensure accuracy; however, inadvertent computerized transcription errors may be present.

## 2019-06-03 NOTE — PATIENT INSTRUCTIONS
Progress to Weightbearing as tolerated in the boot over the next week or so  If you are able to full weight bear in the boot without any increased pain then it is OK to begin to wean out of the boot into supportive shoes (or high top boots)  Continue to take calcium + vitamin D supplement daily  Continue with aspirin until you are fully out of the boot  IF increased pain out of the boot then back off weightbearing and go back into boot

## 2019-06-28 ENCOUNTER — TELEPHONE (OUTPATIENT)
Dept: ORTHOPEDIC SURGERY | Age: 24
End: 2019-06-28

## 2019-06-28 NOTE — LETTER
[address]  [address] [facility] [address]  Phone: [phone]  Fax: [phone]    Mode Hyatt PA-C      June 28, 2019     Patient: Linda Rocha   YOB: 1995   Date of Visit: 6/28/2019       To Whom It May Concern: It is my medical opinion that Soheila Olp may return to full duty immediately with no restrictions. Patient is weightbearing as tolerates on the left lower extremity. OK to wear his CAM walking boot as needed for comfort. If you have any questions or concerns, please don't hesitate to call.     Sincerely,        Mode Hyatt PA-C

## 2019-06-28 NOTE — TELEPHONE ENCOUNTER
Suma Guzman called and would like a RTW letter. He did not provide any info as where it needs to be sent.

## 2019-06-28 NOTE — TELEPHONE ENCOUNTER
Spoke with patient, he has been FWB in CAM boot x 2 weeks and weaning into regular work boot without increased pain. Patient requested that we mail the letter for return to work mailed to his home address please.   Electronically signed by Taiwo Klein PA-C on 6/28/2019 at 12:10 PM

## 2020-12-02 ENCOUNTER — HOSPITAL ENCOUNTER (EMERGENCY)
Age: 25
Discharge: HOME | End: 2020-12-02

## 2020-12-02 VITALS
HEART RATE: 90 BPM | SYSTOLIC BLOOD PRESSURE: 146 MMHG | BODY MASS INDEX: 31.02 KG/M2 | OXYGEN SATURATION: 99 % | TEMPERATURE: 97.9 F | DIASTOLIC BLOOD PRESSURE: 86 MMHG | RESPIRATION RATE: 16 BRPM | WEIGHT: 193 LBS | HEIGHT: 66 IN

## 2020-12-02 PROCEDURE — 99284 EMERGENCY DEPT VISIT MOD MDM: CPT

## 2020-12-02 PROCEDURE — 6370000000 HC RX 637 (ALT 250 FOR IP): Performed by: NURSE PRACTITIONER

## 2020-12-02 RX ORDER — TOBRAMYCIN 3 MG/ML
1 SOLUTION/ DROPS OPHTHALMIC EVERY 4 HOURS
Qty: 1 BOTTLE | Refills: 0 | Status: SHIPPED | OUTPATIENT
Start: 2020-12-02 | End: 2020-12-12

## 2020-12-02 RX ORDER — TETRACAINE HYDROCHLORIDE 5 MG/ML
2 SOLUTION OPHTHALMIC ONCE
Status: COMPLETED | OUTPATIENT
Start: 2020-12-02 | End: 2020-12-02

## 2020-12-02 RX ORDER — POLYMYXIN B SULFATE AND TRIMETHOPRIM 1; 10000 MG/ML; [USP'U]/ML
2 SOLUTION OPHTHALMIC ONCE
Status: DISCONTINUED | OUTPATIENT
Start: 2020-12-02 | End: 2020-12-02

## 2020-12-02 RX ORDER — TOBRAMYCIN 3 MG/ML
2 SOLUTION/ DROPS OPHTHALMIC ONCE
Status: COMPLETED | OUTPATIENT
Start: 2020-12-02 | End: 2020-12-02

## 2020-12-02 RX ADMIN — FLUORESCEIN SODIUM 1 EACH: 0.6 STRIP OPHTHALMIC at 14:35

## 2020-12-02 RX ADMIN — TOBRAMYCIN OPHTHALMIC SOLUTION 2 DROP: 3 SOLUTION/ DROPS OPHTHALMIC at 14:59

## 2020-12-02 RX ADMIN — TETRACAINE HYDROCHLORIDE 2 DROP: 5 SOLUTION OPHTHALMIC at 14:35

## 2020-12-02 ASSESSMENT — VISUAL ACUITY
OU: 20/30
OS: 20/50
OD: 20/40

## 2020-12-02 ASSESSMENT — PAIN DESCRIPTION - DESCRIPTORS: DESCRIPTORS: SORE

## 2020-12-02 ASSESSMENT — PAIN DESCRIPTION - LOCATION: LOCATION: EYE

## 2020-12-02 ASSESSMENT — PAIN DESCRIPTION - ORIENTATION: ORIENTATION: LEFT

## 2020-12-02 ASSESSMENT — PAIN DESCRIPTION - PAIN TYPE: TYPE: ACUTE PAIN

## 2020-12-02 ASSESSMENT — PAIN DESCRIPTION - FREQUENCY: FREQUENCY: CONTINUOUS

## 2020-12-02 ASSESSMENT — PAIN SCALES - GENERAL: PAINLEVEL_OUTOF10: 4

## 2020-12-02 ASSESSMENT — PAIN DESCRIPTION - PROGRESSION: CLINICAL_PROGRESSION: GRADUALLY WORSENING

## 2020-12-02 NOTE — ED PROVIDER NOTES
[address] [facility]  Department of Emergency Medicine   ED  Encounter Note  Admit Date/RoomTime: 2020  2:03 PM  ED Room:     NAME: Jovan Kate  : 1995  MRN: [id number]     Chief Complaint:  Eye Problem (states he woke up yesterday and felt like he had an eyelash in his left eye, attempted to flush his eye out at home. today eye is red and having some drainage)    History of Present Illness        Jovan Kate is a 30 y.o. old male presenting to the emergency department by private vehicle, for complaints of foreign body sensation in eye. Patient reports that yesterday morning he woke up and he felt like he had an eyelash stuck in his left upper eyelid and attempted to flush the eye with tap water for approximately 40 minutes. He states that he did attempt to use Visine which made symptoms improve but the left eye remains red and watery. Since onset his symptoms have been stable and mild in severity. Associated signs & symptoms of:  none. The patients tetanus status is up to date approximately 4 years ago. Patient does wear eyeglasses he denies any contact lens use. He denies any photophobia or pain with eye movement. He reports that he does need to get his glasses replaced and denies any blurred vision. Mechanism:     []  FB Exposure     []  Chemical Exposure     []  Direct Trauma     []  High Speed Machinery Use     []  Welding      Circumstances:    []  Contact Lens Use     []  Recent URI Sx's     [x]  Spontaneous Onset     []  Close Contact w/similar Sx's     []  Work Related     History of:     []   Glaucoma     []   Recent Eye Surgery     ROS   Pertinent positives and negatives are stated within HPI, all other systems reviewed and are negative. Past Medical History:  has no past medical history on file. Surgical History:  has a past surgical history that includes Ankle fracture surgery (Left, 2019).     Social History:  reports that he has been smoking cigarettes. He has been smoking about 0.25 packs per day. He has quit using smokeless tobacco.  His smokeless tobacco use included chew. He reports current alcohol use. He reports that he does not use drugs. Family History: family history is not on file. Allergies: Pcn [penicillins]    Physical Exam           ED Triage Vitals [12/02/20 1407]   BP Temp Temp Source Pulse Resp SpO2 Height Weight   (!) 154/90 97.9 °F (36.6 °C) Infrared 90 16 99 % 5' 6\" (1.676 m) 193 lb (87.5 kg)      Oxygen Saturation Interpretation: Normal.    Constitutional:  Alert, development consistent with age. HENT:  NC/NT. Airway patent. Neck:  Normal ROM. Supple. Eyes:         Pupils: equal, round, reactive to light and accommodation. Eyelids: Left upper and lower Swelling/redness:  Mild. Conjunctiva: Left injected(red). Sclera: Left injected(red). Cornea: Left limbal injection is present. EOM:  Intact Bilaterally. Fundoscopic:  grossly normal- discs sharp. Visual Acuity: OS 20/50; OD 20/40; OU 20/30 with glasses. Integument:  No rashes, erythema present, unless noted elsewhere. Lymphatics: No lymphangitis or adenopathy noted. Neurological:  Oriented. Motor functions intact. Lab / Imaging Results   (All laboratory and radiology results have been personally reviewed by myself)  Labs:  No results found for this visit on 12/02/20. Imaging: All Radiology results interpreted by Radiologist unless otherwise noted. No orders to display       ED Course / Medical Decision Making     Medications   tetracaine (TETRAVISC) 0.5 % ophthalmic solution 2 drop (2 drops Left Eye Given 12/2/20 1435)   fluorescein ophthalmic strip 1 each (1 each Left Eye Given 12/2/20 1435)   tobramycin (TOBREX) 0.3 % ophthalmic solution 2 drop (2 drops Left Eye Given 12/2/20 7689)          Consult(s):   None    Procedure(s):  WOOD's LAMP EXAM:  Performed By: HEIDY Sow CNP.     Left Eye: Cornea: normal, no abnormalities were observed, no abrasion or foreign bodies were noted and no corneal ulcer was observed. Flourescein stain: Negative. Anterior chamber: no abnormalities were observed, no cells, no hyphema and no flair. Eyelid was everted and no foreign body noted and irrigated with saline. No pain with eye movement. No photophobia. No proptosis. No orbital swelling. Mild injection. MDM:   Patient had a foreign body sensation with mild injection to the left eye. There was no fluorescein uptake patient has no proptosis, pain with movement or any facial swelling. The eyelid was everted and irrigated did not see any foreign bodies. Patient will be given antibiotic drops and instructions on follow-up with ophthalmology. Patient advised on signs and symptoms warranting immediate return to the ED for reevaluation at any time. Plan of Care/Counseling:  I reviewed today's visit with the patient in addition to providing specific details for the plan of care and counseling regarding the diagnosis and prognosis. Questions are answered at this time and are agreeable with the plan. Assessment      1. Conjunctivitis of left eye, unspecified conjunctivitis type    2. Sensation of foreign body in eye      Plan   Discharge to home  Patient condition is good    New Medications     Discharge Medication List as of 12/2/2020  2:48 PM      START taking these medications    Details   tobramycin (TOBREX) 0.3 % ophthalmic solution Place 1 drop into the left eye every 4 hours for 10 days, Disp-1 Bottle,R-0Print           Electronically signed by HEIDY Marks CNP   DD: 12/2/20  **This report was transcribed using voice recognition software. Every effort was made to ensure accuracy; however, inadvertent computerized transcription errors may be present.   END OF ED PROVIDER NOTE      HEIDY Marks CNP  12/02/20 4179

## 2020-12-02 NOTE — LETTER
[address] [facility] Emergency Department  [address] [facility] [address]  Phone: [phone]               December 2, 2020    Patient: Jovan Kate   YOB: 1995   Date of Visit: 12/2/2020       To Whom It May Concern: Winsome Vides was seen and treated in our emergency department on 12/2/2020. He may return to work on 12/03/2020.       Sincerely,       Oscar Pearson RN         Signature:__________________________________

## 2020-12-07 ENCOUNTER — APPOINTMENT (OUTPATIENT)
Dept: CT IMAGING | Age: 25
End: 2020-12-07

## 2020-12-07 ENCOUNTER — HOSPITAL ENCOUNTER (EMERGENCY)
Age: 25
Discharge: HOME | End: 2020-12-07
Attending: EMERGENCY MEDICINE

## 2020-12-07 VITALS
TEMPERATURE: 97.7 F | BODY MASS INDEX: 30.29 KG/M2 | HEIGHT: 67 IN | WEIGHT: 193 LBS | OXYGEN SATURATION: 98 % | SYSTOLIC BLOOD PRESSURE: 130 MMHG | HEART RATE: 89 BPM | DIASTOLIC BLOOD PRESSURE: 101 MMHG | RESPIRATION RATE: 16 BRPM

## 2020-12-07 PROCEDURE — 99283 EMERGENCY DEPT VISIT LOW MDM: CPT

## 2020-12-07 PROCEDURE — 70486 CT MAXILLOFACIAL W/O DYE: CPT

## 2020-12-07 PROCEDURE — 70450 CT HEAD/BRAIN W/O DYE: CPT

## 2020-12-07 ASSESSMENT — PAIN SCALES - GENERAL: PAINLEVEL_OUTOF10: 5

## 2020-12-07 ASSESSMENT — PAIN DESCRIPTION - ORIENTATION: ORIENTATION: LEFT

## 2020-12-07 ASSESSMENT — PAIN DESCRIPTION - LOCATION: LOCATION: EYE

## 2020-12-07 NOTE — ED PROVIDER NOTES
MARCO Liao is a 30 y.o. male with a PMHx significant for no chronic medical problems who presents with five days of a left eye infection. The patient states that five days ago he noticed some abnormal drainage from his left eye and the next morning he felt like there was a dull ache around it. He states it was also somewhat red. He went to an urgent care and was prescribed tobramycin drops after the eye was stained to rule out a corneal abrasion. He says that there was no improvement after 24 hours on that drop so he went to a different urgent care and was then prescribed neomycin polymyxin b with dexamethasone drops. He states he began using them that day, but that by the next morning his eye had significantly worsened and appeared ot have some underlying bleeding. At that point he decided he needed to get the eye evaluated. He denies any issues seeing out of the eye. Of note, the patient states that after he began having issues with the eye he was inadvertently poked near his left temple. He says he was not hit directly in the eye, but he noticed bruising starting last night. The patient denies recent fever, chills, fatigue, dizziness, vision changes, congestion, rhinorrhea, neck pain, chest pain, palpitations, hx of MI, SOB, cough, wheezing, abdominal pain, N/V/D/C, hematochezia, melena, dysuria, hematuria, generalized weakness and paresthesias. The patient is currently taking no blood thinners. Tobacco Hx:   reports that he has been smoking cigarettes. He has been smoking about 0.25 packs per day. He has quit using smokeless tobacco.  His smokeless tobacco use included chew. Alcohol Hx:   reports current alcohol use. Illicit Drug Hx:  Reports no hx of illicit drug use. The history is provided by the patient. Last Tetanus (if applicable): N/A    Review of Systems   Constitutional: Negative for chills, diaphoresis, fatigue and fever.    HENT: Negative for congestion, rhinorrhea, sinus pressure, sneezing and sore throat. Eyes: Positive for pain, discharge, redness and itching. Negative for photophobia and visual disturbance. Respiratory: Negative for cough, shortness of breath and wheezing. Cardiovascular: Negative for chest pain, palpitations and leg swelling. Gastrointestinal: Negative for abdominal distention, abdominal pain, blood in stool, constipation, diarrhea, nausea and vomiting. Genitourinary: Negative for difficulty urinating, dysuria, flank pain, frequency and hematuria. Musculoskeletal: Negative for arthralgias, back pain, myalgias and neck pain. Skin: Negative for rash and wound. Neurological: Negative for dizziness, syncope, speech difficulty, weakness, light-headedness, numbness and headaches. Physical Exam  Vitals signs and nursing note reviewed. Constitutional:       General: He is awake. He is not in acute distress. Appearance: He is not diaphoretic. HENT:      Head: Normocephalic and atraumatic. Right Ear: External ear normal.      Left Ear: External ear normal.      Nose: Nose normal. No congestion or rhinorrhea. Mouth/Throat:      Mouth: Mucous membranes are moist.      Pharynx: Oropharynx is clear. No posterior oropharyngeal erythema. Eyes:      General: Vision grossly intact. Gaze aligned appropriately. No visual field deficit or scleral icterus. Right eye: No discharge. Left eye: No discharge. Extraocular Movements: Extraocular movements intact. Right eye: Normal extraocular motion and no nystagmus. Left eye: Normal extraocular motion and no nystagmus. Conjunctiva/sclera:      Right eye: Right conjunctiva is not injected. No chemosis or hemorrhage. Left eye: Left conjunctiva is injected. Chemosis and hemorrhage (conjunctival) present. Comments: The patient's left eye shows conjunctival hemorrhage with clear watery drainage (see photos below). PERRL. EOMI. Visual acuity is intact.  There is mild periorbital bruising. Neck:      Musculoskeletal: Normal range of motion and neck supple. No neck rigidity or muscular tenderness. Cardiovascular:      Rate and Rhythm: Normal rate and regular rhythm. Heart sounds: Normal heart sounds. No murmur. No friction rub. No gallop. Comments: Upper extremity and lower extremity distal pulses intact bilaterally +2/4  Pulmonary:      Effort: Pulmonary effort is normal. No respiratory distress. Breath sounds: Normal breath sounds. No wheezing, rhonchi or rales. Abdominal:      General: Bowel sounds are normal. There is no distension. Palpations: Abdomen is soft. Tenderness: There is no abdominal tenderness. There is no guarding. Musculoskeletal: Normal range of motion. General: No tenderness or deformity. Right lower leg: No edema. Left lower leg: No edema. Lymphadenopathy:      Cervical: No cervical adenopathy. Skin:     General: Skin is warm and dry. Capillary Refill: Capillary refill takes less than 2 seconds. Findings: No erythema or rash. Neurological:      General: No focal deficit present. Mental Status: He is alert and oriented to person, place, and time. Sensory: No sensory deficit. Motor: No weakness. Coordination: Coordination normal.                    ---------------------------------- PAST HISTORY ---------------------------------------------  Past Medical History:  has no past medical history on file. Past Surgical History:  has a past surgical history that includes Ankle fracture surgery (Left, 4/19/2019). Social History:  reports that he has been smoking cigarettes. He has been smoking about 0.25 packs per day. He has quit using smokeless tobacco.  His smokeless tobacco use included chew. He reports current alcohol use. He reports that he does not use drugs. Family History: family history is not on file. Home Meds: Not in a hospital admission.   The patients home medications have been reviewed. Allergies: Pcn [penicillins]    ------------------------- NURSING NOTES AND VITALS REVIEWED ---------------------------  Date / Time Roomed:  12/7/2020  8:32 AM  ED Bed Assignment:  [address] [place] [address]    The nursing notes within the ED encounter and vital signs as below have been reviewed. BP (!) 130/101   Pulse 89   Temp 97.7 °F (36.5 °C)   Resp 16   Ht 5' 7\" (1.702 m)   Wt 193 lb (87.5 kg)   SpO2 98%   BMI 30.23 kg/m²   -------------------------------------------------- RESULTS / INTERVENTIONS -------------------------------------------------  All laboratory and radiology tests have been reviewed by this physician. LABS:  No results found for this visit on 12/07/20. RADIOLOGY: Interpreted by Radiologist unless otherwise noted. CT Head WO Contrast   Final Result   No acute abnormality of the head. CT FACIAL BONES WO CONTRAST   Final Result   No acute traumatic injury of the facial bones. CT Head WO Contrast   Final Result   No acute abnormality of the head. CT FACIAL BONES WO CONTRAST   Final Result   No acute traumatic injury of the facial bones. Oxygen Saturation Interpretation: Normal    Meds Given:  Medications - No data to display    Procedures:  No procedures performed. --------------------------------- PROGRESS NOTES / ADDITIONAL PROVIDER NOTES ---------------------------------  Consultations:  As outlined below. ED Course:    ED Course as of Dec 08 0320   Mon Dec 07, 2020   1010 On reevaluation, the patient is resting comfortably in chair. I informed him that his CTs were negative for acute fracture and other traumatic injury. I told him I placed a call to ophthalmology to discuss his case and see whether they would like to see him in office today or if he should just follow up later this week. [ML]   1209 Case discussed with Dr. Ector Tavera of ophthalmology.  He states that the patient can be seen in office today if he is able to make it. Discussed with the patient who stated he would head there as soon as he was discharged. [ML]      ED Course User Index  [ML] Laura Patel DO     9769: All results were discussed with the patient and I have provided specific details regarding the plan of care, diagnosis and associated prognosis. The patient tolerated the visit well and, at the time of discharge, he was without objective evidence of hemodynamic instability or an acute process (biological or psychological) requiring hospitalization and inpatient management. He was seen by myself and the assigned attending physician, Dr. Roz Cordero, who agreed with my assessment and plan as laid out herein. The importance of follow-up was discussed at the end of the visit and I recommended that the patient be seen by ophthalmology today. The patient verbalized his understanding and agreement with the plan as presented and stated his intention to follow up. Reasons to return to the ER or seek immediate evaluation by a medical provider were discussed at length and all questions were answered. The patient was discharged home in stable condition. MDM:  Patient presented from home complaining of several days of left eye pain, redness and drainage. On arrival, the patient was hypertensive with remaining VS wnl. Physical exam was as documented above. Notable conjunctival hemorrhage was identified (see pictures above). Imaging was ordered to rule out fractures and other intracranial pathology. CT head and CT face were both negative. Case was discussed with ophtho who requested to see the patient in office today or tomorrow. They also advised that he continue using the prescribed eye drops. He was stable at the time of his disposition. Discharge Medication List as of 12/7/2020 11:54 AM          Diagnosis:  1. Viral conjunctivitis of left eye    2.  Conjunctival hemorrhage of left eye        Disposition:  Patient's disposition: Discharge to home  Patient's condition is stable. This patient was seen, examined and treated with Dr. Charlie Munoz. All aspects of the patient's care were discussed with the attending physician.        Hailey Bella, DO  Resident  12/08/20 1629

## 2020-12-08 ASSESSMENT — ENCOUNTER SYMPTOMS
BACK PAIN: 0
ABDOMINAL DISTENTION: 0
WHEEZING: 0
ABDOMINAL PAIN: 0
EYE DISCHARGE: 1
EYE REDNESS: 1
PHOTOPHOBIA: 0
EYE ITCHING: 1
SORE THROAT: 0
DIARRHEA: 0
BLOOD IN STOOL: 0
NAUSEA: 0
CONSTIPATION: 0
SHORTNESS OF BREATH: 0
SINUS PRESSURE: 0
VOMITING: 0
RHINORRHEA: 0
COUGH: 0
EYE PAIN: 1

## 2020-12-08 ASSESSMENT — VISUAL ACUITY: OU: 1

## 2021-08-24 ENCOUNTER — HOSPITAL ENCOUNTER (EMERGENCY)
Age: 26
Discharge: HOME | End: 2021-08-24

## 2021-08-24 VITALS
HEART RATE: 67 BPM | OXYGEN SATURATION: 99 % | SYSTOLIC BLOOD PRESSURE: 176 MMHG | RESPIRATION RATE: 16 BRPM | DIASTOLIC BLOOD PRESSURE: 97 MMHG | TEMPERATURE: 98.3 F | WEIGHT: 209 LBS | BODY MASS INDEX: 32.73 KG/M2

## 2021-08-24 DIAGNOSIS — H66.90 ACUTE OTITIS MEDIA, UNSPECIFIED OTITIS MEDIA TYPE: Primary | ICD-10-CM

## 2021-08-24 PROCEDURE — 99211 OFF/OP EST MAY X REQ PHY/QHP: CPT

## 2021-08-24 RX ORDER — DOXYCYCLINE HYCLATE 100 MG
100 TABLET ORAL 2 TIMES DAILY
Qty: 14 TABLET | Refills: 0 | Status: SHIPPED | OUTPATIENT
Start: 2021-08-24 | End: 2021-08-31

## 2021-08-24 ASSESSMENT — PAIN DESCRIPTION - ORIENTATION: ORIENTATION: RIGHT

## 2021-08-24 ASSESSMENT — PAIN SCALES - GENERAL: PAINLEVEL_OUTOF10: 5

## 2021-08-24 ASSESSMENT — PAIN DESCRIPTION - LOCATION: LOCATION: EAR

## 2021-08-24 NOTE — ED PROVIDER NOTES
HPI: Lulu Thao is a 27 y.o. male with a past medical history of  has no past medical history on file. presenting with complaints of a right ear pain. The patient states that these symptoms began gradually. The history is obtained from the patient. The patient states that he has had some subjective chills at home. Patient does complain of a mild cough associated with it that is nonproductive. Patient denies excessive fatigue or sleeping greater than 18 hours a day. Patient denies exposure to mononucleosis. The patient denies any abdominal pain, left upper quadrant fullness, or early satiety. The patient also denies difficulty breathing, hemoptysis, neck pain/stiffness, or blurry vision. Sx have persisted and are mildly worse which is what prompted the visit today. unknown exposure to sick contacts. Presents for complaints of pain and pressure to the right ear which began yesterday. He denies any recent swimming. Denies any recent traveling. Denies any fever, body aches or chills. Denies any associated symptoms. States he had a call off work today due to the pain.        ROS:   Pertinent positives and negatives are stated within HPI, all other systems reviewed and are negative.      --------------------------------------------- PAST HISTORY ---------------------------------------------  Past Medical History:  has no past medical history on file. Past Surgical History:  has a past surgical history that includes Ankle fracture surgery (Left, 4/19/2019). Social History:  reports that he has been smoking cigarettes. He has been smoking about 0.25 packs per day. He has quit using smokeless tobacco.  His smokeless tobacco use included chew. He reports current alcohol use. He reports that he does not use drugs. Family History: family history is not on file. The patients home medications have been reviewed.     Allergies: Pcn [penicillins]    ------------------------- NURSING NOTES AND VITALS REVIEWED ---------------------------   The nursing notes within the ED encounter and vital signs as below have been reviewed by myself. BP (!) 176/97   Pulse 67   Temp 98.3 °F (36.8 °C)   Resp 16   Wt 209 lb (94.8 kg)   SpO2 99%   BMI 32.73 kg/m²   Oxygen Saturation Interpretation: Normal    The patients available past medical records and past encounters were reviewed. Physical exam:  Constitutional: Vital signs are reviewed the patient is comfortable. The patient is alert and oriented and conversant. Head: The head is atraumatic and normocephalic. Eyes: No discharge is present from the eyes. The sclera are normal.  ENT: The oropharynx demonstrates a small amount of erythema bilaterally. There is no tonsillar enlargement nor is there any exudate present. No uvular deviation or edema. No tonsillary asymmetry.  Floor of the mouth soft, no trismus, handling secretions. TM on the left demonstrate no evidence of infection, however the right is erythematous and bulging but no visible exudate noted. No pain with tragus tugging. Neck: Normal range of motion is achieved in the neck. There is no JVD present. No meningeal signs are present   Anterior cervical adenopathy is normal.  Respiratory/chest: The chest is nontender. Breath sounds are normal. There is no respiratory distress.   Cardiovascular: Heart shows a regular rate and rhythm without murmurs clicks or gallops. Abdominal exam: The abdomen is non tender without evidence of peritoneal signs. Specific attention to the left upper quadrant with palpation of the spleen demonstrates no organomegaly or tenderness  Skin: warm and dry, without rash  Neurologic: GCS 15   Psych: Normal Affect  -------------------------------------------------- RESULTS -------------------------------------------------    LABS:  No results found for this visit on 08/24/21.     RADIOLOGY:  Interpreted by Radiologist.  No orders to display ------------------------------ ED COURSE/MEDICAL DECISION MAKING----------------------  Medications - No data to display          Medical Decision Making:      Right otitis media. not hypoxic, nothing to suggests pneumonia. Well appearing, non toxic, appropriate for outpatient management. Plan is for symptom management and PCP follow up.         This patient's ED course included: a personal history and physicial eaxmination and re-evaluation prior to disposition    This patient has remained hemodynamically stable during their ED course. Counseling: The emergency provider has spoken with the patient and discussed todays results, in addition to providing specific details for the plan of care and counseling regarding the diagnosis and prognosis. Questions are answered at this time and they are agreeable with the plan.       --------------------------------- IMPRESSION AND DISPOSITION ---------------------------------    IMPRESSION  1.  Acute otitis media, unspecified otitis media type        DISPOSITION  Disposition: Discharge to home  Patient condition is good             HEIDY Armendariz - CNP  08/24/21 0947

## 2021-10-12 ENCOUNTER — HOSPITAL ENCOUNTER (EMERGENCY)
Age: 26
Discharge: HOME | End: 2021-10-12

## 2021-10-12 VITALS
WEIGHT: 205 LBS | TEMPERATURE: 97.5 F | SYSTOLIC BLOOD PRESSURE: 132 MMHG | BODY MASS INDEX: 32.18 KG/M2 | RESPIRATION RATE: 16 BRPM | OXYGEN SATURATION: 98 % | HEIGHT: 67 IN | HEART RATE: 89 BPM | DIASTOLIC BLOOD PRESSURE: 87 MMHG

## 2021-10-12 DIAGNOSIS — H65.02 NON-RECURRENT ACUTE SEROUS OTITIS MEDIA OF LEFT EAR: Primary | ICD-10-CM

## 2021-10-12 DIAGNOSIS — J06.9 ACUTE UPPER RESPIRATORY INFECTION: ICD-10-CM

## 2021-10-12 PROCEDURE — 99283 EMERGENCY DEPT VISIT LOW MDM: CPT

## 2021-10-12 RX ORDER — FLUTICASONE PROPIONATE 50 MCG
2 SPRAY, SUSPENSION (ML) NASAL DAILY
Qty: 1 EACH | Refills: 0 | Status: SHIPPED | OUTPATIENT
Start: 2021-10-12 | End: 2022-03-03 | Stop reason: ALTCHOICE

## 2021-10-12 RX ORDER — AZITHROMYCIN 250 MG/1
TABLET, FILM COATED ORAL
Qty: 6 TABLET | Refills: 0 | Status: SHIPPED | OUTPATIENT
Start: 2021-10-12 | End: 2022-03-03 | Stop reason: ALTCHOICE

## 2021-10-13 NOTE — ED PROVIDER NOTES
Interpretation: Normal on room air analysis. ED Triage Vitals   BP Temp Temp Source Pulse Resp SpO2 Height Weight   10/12/21 1849 10/12/21 1849 10/12/21 1849 10/12/21 1849 10/12/21 1849 10/12/21 1849 10/12/21 1857 10/12/21 1857   132/87 97.5 °F (36.4 °C) Infrared 89 16 98 % 5' 7\" (1.702 m) 205 lb (93 kg)         · Constitutional:  Alert, development consistent with age. No apparent distress. · Ears:  External Ears: No pain on manipulation of the tragus and pinna bilaterally. No mastoid tenderness bilaterally. TM's & External Canals: The right canal and TM are normal.  The left canal is normal with an injected TM with serous fluid. No perforation. · Nose:   There is clear rhinorrhea. · Mouth:  No buccal lesions. Mucosa moist.   · Throat: Airway Patent. Tonsils are 1+ with no erythema, exudate or palatal petechiae. There is no purulent sinus drainage in the posterior oropharynx. .   · Neck:  Supple. There is no  anterior cervical and posterior cervical node tenderness. · Respiratory:   Lung sounds clear and equal bilaterally. No wheezing or rhonchi. . No stridor or respiratory distress. · CV:  Regular rate and rhythm, no murmur. No resting tachycardia. Strong radial pulse. · GI:  Active BS. Abdomen soft, non-tender, non-distended. No firm or pulsatile mass. · Integument:  Normal turgor and normal capillary refill. No cyanosis. Warm and dry without visible rash. · Neurological:  Alert and oriented. Motor functions intact. Full sensation. Lab / Imaging Results   (All laboratory and radiology results have been personally reviewed by myself)  Labs:  No results found for this visit on 10/12/21. Imaging: All Radiology results interpreted by Radiologist unless otherwise noted.   No orders to display       ED Course / Medical Decision Making   Medications - No data to display     Consults:   None    Procedures:   none    Medical Decision Making:     Charity Juarez presented to ED with complaints of Facial Pain (sinus pressure for 12 days and unable to sleep)    Imaging was not done as He is not hypoxic and no suspicion for pneumonia. There is low suspicion for COVID-19, therefore, testing was offered and patient declines. Based on the history and physical examination findings, the causative nature of illness is likely to be Viral in etiology. Therefore, plan is for symptom management with continued OTC sinus preparation with the addition of Flonase and watchful waiting for worsening of left otitis media which she was given a printed prescription for azithromycin. Patient is well appearing, non toxic and appropriate for outpatient management as listed below:    Normal progression of disease discussed. All questions answered. Explained the rationale for symptomatic treatment rather than use of an antibiotic. Instruction provided in the use of fluids, vaporizer, acetaminophen, and other OTC medication for symptom control. Extra fluids  Analgesics as needed, dosages and times reviewed. Follow up as needed should symptoms fail to improve. Explained specific instructions on when to return for re-evaluation should symptoms or condition worsen    Patient verbalizes understanding of instructions, is amenable to this outpatient management plan and departed in stable condition. Assessment     1. Non-recurrent acute serous otitis media of left ear    2. Acute upper respiratory infection      Plan   Discharged home.   Patient condition is stable    New Medications     Discharge Medication List as of 10/12/2021  8:20 PM      START taking these medications    Details   fluticasone (FLONASE) 50 MCG/ACT nasal spray 2 sprays by Each Nostril route daily, Disp-1 each, R-0Normal      azithromycin (ZITHROMAX) 250 MG tablet Take two tablets on day 1, then one tablet days 2-5., Disp-6 tablet, R-0Print           Electronically signed by HEIDY Caballero CNP   DD: 10/12/21  **This report was transcribed using voice recognition software. Every effort was made to ensure accuracy; however, inadvertent computerized transcription errors may be present.   END OF ED PROVIDER NOTE        Anat Castillo, APRN - CNP  10/12/21 9309

## 2022-03-03 ENCOUNTER — HOSPITAL ENCOUNTER (EMERGENCY)
Age: 27
Discharge: HOME | End: 2022-03-03
Payer: COMMERCIAL

## 2022-03-03 VITALS
WEIGHT: 196 LBS | TEMPERATURE: 97.8 F | HEART RATE: 90 BPM | OXYGEN SATURATION: 96 % | BODY MASS INDEX: 30.76 KG/M2 | RESPIRATION RATE: 16 BRPM | SYSTOLIC BLOOD PRESSURE: 134 MMHG | DIASTOLIC BLOOD PRESSURE: 67 MMHG | HEIGHT: 67 IN

## 2022-03-03 DIAGNOSIS — H65.01 NON-RECURRENT ACUTE SEROUS OTITIS MEDIA OF RIGHT EAR: Primary | ICD-10-CM

## 2022-03-03 DIAGNOSIS — J02.9 ACUTE PHARYNGITIS, UNSPECIFIED ETIOLOGY: ICD-10-CM

## 2022-03-03 LAB — STREP GRP A PCR: NEGATIVE

## 2022-03-03 PROCEDURE — 6370000000 HC RX 637 (ALT 250 FOR IP): Performed by: NURSE PRACTITIONER

## 2022-03-03 PROCEDURE — 87880 STREP A ASSAY W/OPTIC: CPT

## 2022-03-03 PROCEDURE — 99282 EMERGENCY DEPT VISIT SF MDM: CPT

## 2022-03-03 RX ORDER — IBUPROFEN 600 MG/1
600 TABLET ORAL ONCE
Status: COMPLETED | OUTPATIENT
Start: 2022-03-03 | End: 2022-03-03

## 2022-03-03 RX ORDER — AZITHROMYCIN 250 MG/1
TABLET, FILM COATED ORAL
Qty: 6 TABLET | Refills: 0 | Status: SHIPPED | OUTPATIENT
Start: 2022-03-03 | End: 2022-06-09

## 2022-03-03 RX ORDER — M-VIT,TX,IRON,MINS/CALC/FOLIC 27MG-0.4MG
1 TABLET ORAL DAILY
COMMUNITY

## 2022-03-03 RX ADMIN — IBUPROFEN 600 MG: 600 TABLET ORAL at 15:45

## 2022-03-03 ASSESSMENT — PAIN DESCRIPTION - PAIN TYPE: TYPE: ACUTE PAIN

## 2022-03-03 ASSESSMENT — PAIN DESCRIPTION - PROGRESSION: CLINICAL_PROGRESSION: NOT CHANGED

## 2022-03-03 ASSESSMENT — PAIN DESCRIPTION - LOCATION: LOCATION: EAR

## 2022-03-03 ASSESSMENT — PAIN DESCRIPTION - FREQUENCY: FREQUENCY: CONTINUOUS

## 2022-03-03 ASSESSMENT — PAIN SCALES - GENERAL: PAINLEVEL_OUTOF10: 5

## 2022-03-03 ASSESSMENT — PAIN DESCRIPTION - DESCRIPTORS: DESCRIPTORS: ACHING

## 2022-03-03 ASSESSMENT — PAIN DESCRIPTION - ONSET: ONSET: ON-GOING

## 2022-03-03 ASSESSMENT — PAIN DESCRIPTION - ORIENTATION: ORIENTATION: RIGHT

## 2022-03-03 ASSESSMENT — PAIN - FUNCTIONAL ASSESSMENT: PAIN_FUNCTIONAL_ASSESSMENT: 0-10

## 2022-03-03 NOTE — ED PROVIDER NOTES
[address]  Department of Emergency Medicine   ED  Encounter Note  Admit Date/RoomTime: 3/3/2022  3:11 PM  [facility]    NAME: Adonay Sawant  : 1995  MRN: [id number]     Chief Complaint:  Otalgia (right ear pain since this am, rates pain 5)    History of Present Illness       Adonay Sawant is a 32 y.o. old male who presents to the emergency department for evaluation of right ear pain that began this morning upon awakening. He states he has noticed an associated sore throat with otherwise no known exposure to strep, mono, influenza or COVID-19. He has had no associated fever, chills, nasal congestion, runny nose, cough, chest pain, shortness of breath or GI symptoms. Is not taken any over-the-counter intervention for symptoms. They are aggravated by nothing in particular. His symptoms are mild in severity and persistent nature. He works at [employer] and requires an evaluation for return to work. ROS   Pertinent positives and negatives are stated within HPI, all other systems reviewed and are negative. Past Medical History:  has no past medical history on file. Surgical History:  has a past surgical history that includes Ankle fracture surgery (Left, 2019). Social History:  reports that he has been smoking cigarettes. He has been smoking about 0.25 packs per day. He has quit using smokeless tobacco.  His smokeless tobacco use included chew. He reports current alcohol use. He reports that he does not use drugs. Family History: family history is not on file. Allergies: Pcn [penicillins]    Physical Exam   Oxygen Saturation Interpretation: Normal on room air analysis.         ED Triage Vitals   BP Temp Temp Source Pulse Resp SpO2 Height Weight   22 1453 22 1453 22 1453 22 1453 22 1453 22 1453 22 1512 22 1512   134/67 97.8 °F (36.6 °C) Oral 106 16 96 % 5' 7\" (1.702 m) 196 lb (88.9 kg)         · Constitutional:  Alert, development consistent with age. No apparent distress. · Ears:  External Ears: No pain on manipulation of the tragus and pinna bilaterally. No mastoid tenderness bilaterally. TM's & External Canals: normal left TM and external ear canal, right canal is normal with an erythematous TM with good cone of light and bony landmark. · Nose:   There is no discharge, swelling or lesions noted. · Mouth:  No buccal lesions. Mucosa moist.   · Throat: Airway Patent. Tonsils 2+ with moderate erythema, no palatal petechiae or exudate. Normal phonation. No PTA. · Neck:  Supple. There is bilateral anterior cervical node tenderness. · Respiratory:   Lung sounds clear and equal without wheezing or rhonchi bilaterally. No stridor or respiratory distress. · CV:  Regular rate and rhythm, no murmur. Strong radial pulse. · GI:  Active BS. Abdomen soft, non-tender, non-distended. No firm or pulsatile mass. · Integument:  Normal turgor and normal capillary refill. No cyanosis. Warm and dry without visible rash. · Neurological:  Alert and oriented. Motor functions intact. Full sensation. Lab / Imaging Results   (All laboratory and radiology results have been personally reviewed by myself)  Labs:  Results for orders placed or performed during the hospital encounter of 03/03/22   Strep Screen Group A Throat    Specimen: Throat   Result Value Ref Range    Strep Grp A PCR Negative Negative       Imaging: All Radiology results interpreted by Radiologist unless otherwise noted. No orders to display       ED Course / Medical Decision Making     Medications   ibuprofen (ADVIL;MOTRIN) tablet 600 mg (600 mg Oral Given 3/3/22 1545)        Re-examination:  3/3/22       Time: 1610   Patients condition remains unchanged and remains stable. Discussed results and treatment plan.     Consults:   None    Procedures:   none    Medical Decision Making:     Aaron Tamayo presented to ED with complaints of Otalgia (right ear pain since this am, rates pain 5)    Negative strep test with no culture pending. No chest x-ray as his lung sounds are clear and he has no cough. Patient declines COVID or influenza testing for work. Right serous otitis media with plan for watchful waiting for starting antibiotics as discussed. Otherwise outpatient treatment and management as below:    Normal progression of disease discussed. All questions answered. Explained the rationale for symptomatic treatment rather than use of an antibiotic. Instruction provided in the use of fluids, vaporizer, acetaminophen, and other OTC medication for symptom control. Extra fluids  Analgesics as needed, dosages and times reviewed. Follow up as needed should symptoms fail to improve. Explained specific instructions on when to return for re-evaluation should symptoms or condition worsen    Patient verbalizes understanding of instructions, is amenable to this outpatient management plan and departed in stable condition. Assessment     1. Non-recurrent acute serous otitis media of right ear    2. Acute pharyngitis, unspecified etiology      Plan   Discharged home. Patient condition is stable    New Medications     New Prescriptions    AZITHROMYCIN (ZITHROMAX) 250 MG TABLET    Take two tablets on day 1, then one tablet days 2-5. Electronically signed by HEIDY Browne CNP   DD: 3/3/22  **This report was transcribed using voice recognition software. Every effort was made to ensure accuracy; however, inadvertent computerized transcription errors may be present.   END OF ED PROVIDER NOTE       HEIDY Bronwe CNP  03/03/22 0608

## 2022-03-03 NOTE — Clinical Note
Rita Stevenson was seen and treated in our emergency department on 3/3/2022. He may return to work on 03/04/2022. If you have any questions or concerns, please don't hesitate to call.       Viktor Michaels, HEIDY - CNP

## 2022-04-10 ENCOUNTER — HOSPITAL ENCOUNTER (EMERGENCY)
Age: 27
Discharge: HOME | End: 2022-04-10
Payer: COMMERCIAL

## 2022-04-10 VITALS
DIASTOLIC BLOOD PRESSURE: 55 MMHG | RESPIRATION RATE: 18 BRPM | HEIGHT: 67 IN | HEART RATE: 89 BPM | TEMPERATURE: 98.6 F | BODY MASS INDEX: 30.92 KG/M2 | SYSTOLIC BLOOD PRESSURE: 100 MMHG | WEIGHT: 197 LBS | OXYGEN SATURATION: 98 %

## 2022-04-10 DIAGNOSIS — J01.00 ACUTE NON-RECURRENT MAXILLARY SINUSITIS: Primary | ICD-10-CM

## 2022-04-10 PROCEDURE — 99211 OFF/OP EST MAY X REQ PHY/QHP: CPT

## 2022-04-10 RX ORDER — GUAIFENESIN 600 MG/1
600 TABLET, EXTENDED RELEASE ORAL 2 TIMES DAILY
Qty: 30 TABLET | Refills: 0 | Status: SHIPPED | OUTPATIENT
Start: 2022-04-10 | End: 2022-04-25

## 2022-04-10 RX ORDER — AZITHROMYCIN 250 MG/1
TABLET, FILM COATED ORAL
Qty: 1 PACKET | Refills: 0 | Status: SHIPPED | OUTPATIENT
Start: 2022-04-10 | End: 2022-04-14

## 2022-04-10 ASSESSMENT — PAIN SCALES - GENERAL: PAINLEVEL_OUTOF10: 0

## 2022-04-10 ASSESSMENT — PAIN - FUNCTIONAL ASSESSMENT: PAIN_FUNCTIONAL_ASSESSMENT: 0-10

## 2022-05-30 ENCOUNTER — HOSPITAL ENCOUNTER (EMERGENCY)
Age: 27
Discharge: HOME | End: 2022-05-30
Attending: EMERGENCY MEDICINE
Payer: COMMERCIAL

## 2022-05-30 VITALS
SYSTOLIC BLOOD PRESSURE: 140 MMHG | WEIGHT: 207 LBS | HEART RATE: 85 BPM | TEMPERATURE: 97.9 F | OXYGEN SATURATION: 98 % | RESPIRATION RATE: 16 BRPM | DIASTOLIC BLOOD PRESSURE: 89 MMHG | BODY MASS INDEX: 32.42 KG/M2

## 2022-05-30 DIAGNOSIS — J02.9 VIRAL PHARYNGITIS: Primary | ICD-10-CM

## 2022-05-30 LAB — STREP GRP A PCR: NEGATIVE

## 2022-05-30 PROCEDURE — 99283 EMERGENCY DEPT VISIT LOW MDM: CPT

## 2022-05-30 PROCEDURE — 87880 STREP A ASSAY W/OPTIC: CPT

## 2022-05-30 RX ORDER — IBUPROFEN 600 MG/1
600 TABLET ORAL 4 TIMES DAILY PRN
Qty: 40 TABLET | Refills: 0 | Status: SHIPPED | OUTPATIENT
Start: 2022-05-30 | End: 2022-06-17 | Stop reason: ALTCHOICE

## 2022-05-30 ASSESSMENT — ENCOUNTER SYMPTOMS
NAUSEA: 0
SHORTNESS OF BREATH: 0
DIARRHEA: 0
COUGH: 0
VOMITING: 0
SORE THROAT: 1
RHINORRHEA: 0
BLOOD IN STOOL: 0
TROUBLE SWALLOWING: 0
COLOR CHANGE: 0
ABDOMINAL PAIN: 0

## 2022-05-30 ASSESSMENT — PAIN DESCRIPTION - FREQUENCY: FREQUENCY: CONTINUOUS

## 2022-05-30 ASSESSMENT — PAIN - FUNCTIONAL ASSESSMENT
PAIN_FUNCTIONAL_ASSESSMENT: 0-10
PAIN_FUNCTIONAL_ASSESSMENT: ACTIVITIES ARE NOT PREVENTED

## 2022-05-30 ASSESSMENT — PAIN DESCRIPTION - ONSET: ONSET: ON-GOING

## 2022-05-30 ASSESSMENT — PAIN SCALES - GENERAL
PAINLEVEL_OUTOF10: 0
PAINLEVEL_OUTOF10: 5

## 2022-05-30 ASSESSMENT — PAIN DESCRIPTION - LOCATION: LOCATION: THROAT

## 2022-05-30 ASSESSMENT — PAIN DESCRIPTION - DESCRIPTORS: DESCRIPTORS: SORE

## 2022-05-30 ASSESSMENT — PAIN DESCRIPTION - PAIN TYPE: TYPE: ACUTE PAIN

## 2022-05-30 NOTE — ED PROVIDER NOTES
Highest education level: None   Occupational History    None   Tobacco Use    Smoking status: Current Every Day Smoker     Packs/day: 0.25     Types: Cigarettes    Smokeless tobacco: Former User     Types: Chew   Substance and Sexual Activity    Alcohol use: Yes     Comment: 3-4 times weekly- 4 AT A TIME    Drug use: No    Sexual activity: None   Other Topics Concern    None   Social History Narrative    None     Social Determinants of Health     Financial Resource Strain:     Difficulty of Paying Living Expenses: Not on file   Food Insecurity:     Worried About Running Out of Food in the Last Year: Not on file    Tyron of Food in the Last Year: Not on file   Transportation Needs:     Lack of Transportation (Medical): Not on file    Lack of Transportation (Non-Medical): Not on file   Physical Activity:     Days of Exercise per Week: Not on file    Minutes of Exercise per Session: Not on file   Stress:     Feeling of Stress : Not on file   Social Connections:     Frequency of Communication with Friends and Family: Not on file    Frequency of Social Gatherings with Friends and Family: Not on file    Attends Latter-day Services: Not on file    Active Member of [address] [place] [address] or Organizations: Not on file    Attends Club or Organization Meetings: Not on file    Marital Status: Not on file   Intimate Partner Violence:     Fear of Current or Ex-Partner: Not on file    Emotionally Abused: Not on file    Physically Abused: Not on file    Sexually Abused: Not on file   Housing Stability:     Unable to Pay for Housing in the Last Year: Not on file    Number of [place] in the Last Year: Not on file    Unstable Housing in the Last Year: Not on file       Family History:   History reviewed. No pertinent family history. The patients home medications have been reviewed. Allergies:    Allergies   Allergen Reactions    Pcn [Penicillins] ---------------------------------------------------PHYSICAL EXAM--------------------------------------    BP (!) 140/89   Pulse 85   Temp 97.9 °F (36.6 °C) (Oral)   Resp 16   Wt 207 lb (93.9 kg)   SpO2 98%   BMI 32.42 kg/m²     Physical Exam  Vitals and nursing note reviewed. Constitutional:       General: He is not in acute distress. Appearance: He is not toxic-appearing. HENT:      Mouth/Throat:      Mouth: Mucous membranes are moist.      Pharynx: Posterior oropharyngeal erythema present. No oropharyngeal exudate. Eyes:      General: No scleral icterus. Extraocular Movements: Extraocular movements intact. Pupils: Pupils are equal, round, and reactive to light. Cardiovascular:      Rate and Rhythm: Normal rate and regular rhythm. Pulses: Normal pulses. Heart sounds: Normal heart sounds. No murmur heard. Pulmonary:      Effort: Pulmonary effort is normal. No respiratory distress. Breath sounds: Normal breath sounds. No wheezing or rales. Abdominal:      General: There is no distension. Palpations: Abdomen is soft. Tenderness: There is no abdominal tenderness. There is no guarding or rebound. Musculoskeletal:         General: No swelling or tenderness. Normal range of motion. Cervical back: Normal range of motion and neck supple. No rigidity. No muscular tenderness. Skin:     General: Skin is warm and dry. Neurological:      Mental Status: He is alert and oriented to person, place, and time. Comments: Moves all extremities with appropriate strength. Sensation grossly intact in all extremities. -------------------------------------------------- RESULTS -------------------------------------------------  I have personally reviewed all laboratory and imaging results for this patient. Results are listed below.      LABS:  Labs Reviewed   STREP SCREEN GROUP A THROAT     ------------------------- NURSING NOTES AND VITALS REVIEWED ---------------------------   The nursing notes within the ED encounter and vital signs as below have been reviewed by myself. BP (!) 140/89   Pulse 85   Temp 97.9 °F (36.6 °C) (Oral)   Resp 16   Wt 207 lb (93.9 kg)   SpO2 98%   BMI 32.42 kg/m²   Oxygen Saturation Interpretation: Normal    The patients available past medical records and past encounters were reviewed. ------------------------------ ED COURSE/MEDICAL DECISION MAKING----------------------    Counseling: The emergency provider has spoken with the patient and discussed todays results, in addition to providing specific details for the plan of care and counseling regarding the diagnosis and prognosis. Questions are answered at this time and they are agreeable with the plan. ED Course/Medical Decision Makin y.o. male here with sore throat likely 2/2 viral pharyngitis. Non-toxic appearing, afebrile, hemodynamically stable, and in no acute distress. No evidence of impending airway compromise. After discussion of findings and return precautions, patient agrees with plan for discharge and outpatient follow up with PCP. Rx ibuprofen and magic mouthwash.       --------------------------------- IMPRESSION AND DISPOSITION ---------------------------------    IMPRESSION  1. Viral pharyngitis        DISPOSITION  Disposition: Discharge to home  Patient condition is good    NOTE: This report was transcribed using voice recognition software.  Every effort was made to ensure accuracy; however, inadvertent computerized transcription errors may be present    Dahiana Lopez MD  Attending Emergency Physician         Dahiana Lopez MD  [phone]

## 2022-06-09 ENCOUNTER — APPOINTMENT (OUTPATIENT)
Dept: GENERAL RADIOLOGY | Age: 27
End: 2022-06-09
Payer: COMMERCIAL

## 2022-06-09 ENCOUNTER — HOSPITAL ENCOUNTER (EMERGENCY)
Age: 27
Discharge: HOME | End: 2022-06-09
Payer: COMMERCIAL

## 2022-06-09 VITALS
RESPIRATION RATE: 16 BRPM | OXYGEN SATURATION: 98 % | SYSTOLIC BLOOD PRESSURE: 133 MMHG | WEIGHT: 205 LBS | TEMPERATURE: 97.6 F | HEART RATE: 90 BPM | DIASTOLIC BLOOD PRESSURE: 97 MMHG | BODY MASS INDEX: 32.11 KG/M2

## 2022-06-09 DIAGNOSIS — H66.92 ACUTE LEFT OTITIS MEDIA: Primary | ICD-10-CM

## 2022-06-09 DIAGNOSIS — Z20.822 ENCOUNTER FOR LABORATORY TESTING FOR COVID-19 VIRUS: ICD-10-CM

## 2022-06-09 DIAGNOSIS — J01.90 ACUTE NON-RECURRENT SINUSITIS, UNSPECIFIED LOCATION: ICD-10-CM

## 2022-06-09 LAB
INFLUENZA A: NOT DETECTED
INFLUENZA B: NOT DETECTED
SARS-COV-2 RNA, RT PCR: NOT DETECTED

## 2022-06-09 PROCEDURE — 87636 SARSCOV2 & INF A&B AMP PRB: CPT

## 2022-06-09 PROCEDURE — 6370000000 HC RX 637 (ALT 250 FOR IP): Performed by: NURSE PRACTITIONER

## 2022-06-09 PROCEDURE — 99284 EMERGENCY DEPT VISIT MOD MDM: CPT

## 2022-06-09 PROCEDURE — 71046 X-RAY EXAM CHEST 2 VIEWS: CPT

## 2022-06-09 RX ORDER — IBUPROFEN 600 MG/1
600 TABLET ORAL ONCE
Status: COMPLETED | OUTPATIENT
Start: 2022-06-09 | End: 2022-06-09

## 2022-06-09 RX ORDER — AZITHROMYCIN 250 MG/1
TABLET, FILM COATED ORAL
Qty: 6 TABLET | Refills: 0 | Status: SHIPPED | OUTPATIENT
Start: 2022-06-09 | End: 2022-06-17 | Stop reason: ALTCHOICE

## 2022-06-09 RX ADMIN — IBUPROFEN 600 MG: 600 TABLET, FILM COATED ORAL at 14:49

## 2022-06-09 ASSESSMENT — PAIN DESCRIPTION - LOCATION
LOCATION: NECK;THROAT
LOCATION: THROAT

## 2022-06-09 ASSESSMENT — PAIN SCALES - GENERAL
PAINLEVEL_OUTOF10: 5
PAINLEVEL_OUTOF10: 5

## 2022-06-09 ASSESSMENT — PAIN DESCRIPTION - ORIENTATION: ORIENTATION: MID

## 2022-06-09 ASSESSMENT — PAIN - FUNCTIONAL ASSESSMENT: PAIN_FUNCTIONAL_ASSESSMENT: 0-10

## 2022-06-09 ASSESSMENT — PAIN DESCRIPTION - DESCRIPTORS
DESCRIPTORS: ACHING;PRESSURE;SORE
DESCRIPTORS: ACHING;GNAWING

## 2022-06-09 NOTE — ED PROVIDER NOTES
[address]  Department of Emergency Medicine   ED  Encounter Note  Admit Date/RoomTime: 2022  2:20 PM  ED Room: [facility]    NAME: Jessica Fay  : 1995  MRN: [id number]     Chief Complaint:  Sinusitis (sinus drainage,throat pain.)    History of Present Illness       Jessica Fay is a 32 y.o. old male who presents to the emergency department for persistent cold symptoms that began about 2 weeks ago. Patient initially had symptoms of sore throat with some nasal congestion when he was evaluated in the emergency department on May 30. He tested negative for strep and was started on ibuprofen and Magic mouthwash. Since then his symptoms have progressed to continued sore throat, persistent nasal congestion, cough and body aches. He has not taken any ibuprofen today or any other over-the-counter medications for symptoms. His symptoms are aggravated by nothing in particular. His symptoms are moderate in severity and persistent in nature. ROS   Pertinent positives and negatives are stated within HPI, all other systems reviewed and are negative. Past Medical History:  has no past medical history on file. Surgical History:  has a past surgical history that includes Ankle fracture surgery (Left, 2019). Social History:  reports that he has been smoking cigarettes. He has been smoking about 0.25 packs per day. He has quit using smokeless tobacco.  His smokeless tobacco use included chew. He reports current alcohol use. He reports that he does not use drugs. Family History: family history is not on file. Allergies: Pcn [penicillins]    Physical Exam   Oxygen Saturation Interpretation: Normal on room air analysis.         ED Triage Vitals   BP Temp Temp Source Heart Rate Resp SpO2 Height Weight   22 1412 22 1412 22 1412 22 1412 22 1412 22 1412 -- 22 1417   (!) 133/97 97.6 °F (36.4 °C) Temporal (!) 119 18 97 %  205 lb (93 kg)         · Constitutional:  Alert, development consistent with age. No apparent distress. · Ears:  External Ears: No pain on manipulation of the tragus and pinna bilaterally. No mastoid tenderness bilaterally. TM's & External Canals: Right canal and TM are normal.  The left canal is normal with a bulging injected TM with good cone of light and maintenance of the bony landmark. No perforation. · Nose:   There is clear rhinorrhea, mucosal erythema and mucosal edema. · Mouth:  No buccal lesions. Mucosa moist.   · Throat: Airway Patent. Tonsils 2+ with mild erythema, no edema, palatal petechiae or purulent sinus drainage in the posterior oropharynx. .   · Neck:  Supple. There is bilateral anterior cervical node tenderness. · Respiratory:   Lung sounds clear and equal bilaterally without wheezing or rhonchi. No stridor or respiratory distress. · CV:  Regular rate and rhythm, no murmur. Strong radial pulse. · GI:  Active BS. Abdomen soft, non-tender, non-distended. No firm or pulsatile mass. · Integument:  Normal turgor and normal capillary refill. No cyanosis. Warm and dry without visible rash. · Neurological:  Alert and oriented. Motor functions intact. Full sensation. Lab / Imaging Results   (All laboratory and radiology results have been personally reviewed by myself)  Labs:  Results for orders placed or performed during the hospital encounter of 06/09/22   COVID-19 & Influenza Combo    Specimen: Nasopharyngeal Swab   Result Value Ref Range    SARS-CoV-2 RNA, RT PCR NOT DETECTED NOT DETECTED    INFLUENZA A NOT DETECTED NOT DETECTED    INFLUENZA B NOT DETECTED NOT DETECTED       Imaging: All Radiology results interpreted by Radiologist unless otherwise noted. XR CHEST (2 VW)   Final Result   No acute process.              ED Course / Medical Decision Making     Medications   ibuprofen (ADVIL;MOTRIN) tablet 600 mg (600 mg Oral Given 6/9/22 8257) Re-examination:  6/9/22       Time: 1605   Patients condition is improving after treatment. Patient states he feels better after the ibuprofen. Discussed results and treatment plan. Consults:   None    Procedures:   none    Medical Decision Making:     Jhon Varela presented to ED with complaints of Sinusitis (sinus drainage,throat pain.)      Imaging was done. Interpretation as per radiologist shows no acute process. He is not hypoxic. There is high suspicion for COVID-19, therefore, testing was obtained and were negative. Based on the history and physical examination findings, the causative nature of illness is likely to be Bacterial given the appearance of the ear and persistence of symptoms in etiology. Therefore, antibiotics and symptomatic control with supportive meds is/are considered appropriate at this time. Patient is well appearing, non toxic and appropriate for outpatient management as listed below:    Normal progression of disease discussed. All questions answered. Instruction provided in the use of fluids, vaporizer, acetaminophen, and other OTC medication for symptom control. Extra fluids  Analgesics as needed, dosages and times reviewed. Follow-up with primary care provider in a few days, or sooner should symptoms worsen. Explained specific instructions on when to return for re-evaluation should symptoms or condition worsen    Patient verbalizes understanding of instructions, is amenable to this outpatient management plan and departed in stable condition. Assessment     1. Acute left otitis media    2. Acute non-recurrent sinusitis, unspecified location    3. Encounter for laboratory testing for COVID-19 virus      Plan   Discharged home. Patient condition is stable    New Medications     New Prescriptions    AZITHROMYCIN (ZITHROMAX) 250 MG TABLET    Take two tablets on day 1, then one tablet days 2-5.      Electronically signed by HEIDY Molina CNP   DD: 6/9/22  **This report was transcribed using voice recognition software. Every effort was made to ensure accuracy; however, inadvertent computerized transcription errors may be present.   END OF ED PROVIDER NOTE        HEIDY Razo - MELQUIADES  06/09/22 3757

## 2022-06-09 NOTE — ED NOTES
Drank 16 ounces of gatorade and 8 ounces of water while awaiting results     Narinder Almazan RN  06/09/22 9832

## 2022-06-09 NOTE — Clinical Note
Olga Pratt was seen and treated in our emergency department on 6/9/2022. He may return to work on 06/10/2022. If you have any questions or concerns, please don't hesitate to call.       Baltazar Reid, HEIDY - CNP

## 2022-06-14 ENCOUNTER — TELEPHONE (OUTPATIENT)
Dept: FAMILY MEDICINE CLINIC | Age: 27
End: 2022-06-14

## 2022-06-14 NOTE — TELEPHONE ENCOUNTER
Patient was informed that there are no earlier appointments available with one of our providers      ----- Message from Petejanelle Trinidadsina sent at 6/14/2022 12:54 PM EDT -----  Subject: Appointment Request    Reason for Call: Routine ED Follow Up Visit    QUESTIONS  Type of Appointment? New Patient/New to Provider  Reason for appointment request? Available appointments did not meet   patient need  Additional Information for Provider? Patient wants to schedule a urgent   care follow up. Seen 6/9 for sinus and ear pain and he stated the z-pack   prescribed did not clear all symptoms and he would like an appointment   sooner than June 30  ---------------------------------------------------------------------------  --------------  [facility] INFO  What is the best way for the office to contact you? OK to leave message on   voicemail  Preferred Call Back Phone Number? [phone]  ---------------------------------------------------------------------------  --------------  SCRIPT ANSWERS  Relationship to Patient? Self  Specialty Confirmation? Primary Care  (Patient requests to see provider urgently. )? No  Do you have any questions for your primary care provider that need to be   answered prior to your appointment? No  Have you been diagnosed with COVID-19 in the past 10 days? No  (Service Expert - click yes below to proceed with [employer] As Usual   Scheduling)?  Yes

## 2022-06-17 ENCOUNTER — HOSPITAL ENCOUNTER (EMERGENCY)
Age: 27
Discharge: HOME | End: 2022-06-17
Payer: COMMERCIAL

## 2022-06-17 VITALS
HEART RATE: 90 BPM | DIASTOLIC BLOOD PRESSURE: 94 MMHG | WEIGHT: 204 LBS | RESPIRATION RATE: 18 BRPM | TEMPERATURE: 98.7 F | SYSTOLIC BLOOD PRESSURE: 152 MMHG | BODY MASS INDEX: 32.02 KG/M2 | HEIGHT: 67 IN | OXYGEN SATURATION: 99 %

## 2022-06-17 DIAGNOSIS — H65.02 NON-RECURRENT ACUTE SEROUS OTITIS MEDIA OF LEFT EAR: Primary | ICD-10-CM

## 2022-06-17 PROCEDURE — 99211 OFF/OP EST MAY X REQ PHY/QHP: CPT

## 2022-06-17 RX ORDER — LORATADINE 10 MG/1
10 TABLET ORAL DAILY
Qty: 30 TABLET | Refills: 0 | Status: SHIPPED | OUTPATIENT
Start: 2022-06-17 | End: 2022-07-17

## 2022-06-17 RX ORDER — METHYLPREDNISOLONE 4 MG/1
TABLET ORAL
Qty: 1 KIT | Refills: 0 | Status: SHIPPED | OUTPATIENT
Start: 2022-06-17 | End: 2022-06-23

## 2022-06-17 RX ORDER — IBUPROFEN 200 MG
400 TABLET ORAL EVERY 6 HOURS PRN
COMMUNITY

## 2022-06-17 ASSESSMENT — PAIN SCALES - GENERAL: PAINLEVEL_OUTOF10: 3

## 2022-06-17 ASSESSMENT — PAIN DESCRIPTION - FREQUENCY: FREQUENCY: CONTINUOUS

## 2022-06-17 ASSESSMENT — PAIN - FUNCTIONAL ASSESSMENT: PAIN_FUNCTIONAL_ASSESSMENT: 0-10

## 2022-06-17 ASSESSMENT — PAIN DESCRIPTION - DESCRIPTORS: DESCRIPTORS: PRESSURE;DISCOMFORT

## 2022-06-17 ASSESSMENT — PAIN DESCRIPTION - ORIENTATION: ORIENTATION: LEFT

## 2022-06-17 ASSESSMENT — PAIN DESCRIPTION - ONSET: ONSET: GRADUAL

## 2022-06-17 ASSESSMENT — PAIN DESCRIPTION - PAIN TYPE: TYPE: ACUTE PAIN

## 2022-06-17 ASSESSMENT — PAIN DESCRIPTION - LOCATION: LOCATION: EAR

## 2022-06-17 NOTE — ED PROVIDER NOTES
HPI:  6/17/22, Time: 11:06 AM EDT         Deep Currie is a 32 y.o. male presenting to the ED for left sided otalgia, beginning 8 days ago. The complaint has been persistent, moderate in severity, and worsened by nothing. Patient was evaluated for same symptoms 8 days ago and prescribed azithromycin. Has finished prescription however is still having symptoms. Patient reports that the pain is mild however does have a fullness feeling and pressure to the left ear. Slightly decreased hearing and he feels that there is a seashell over his ear. Denies any cough, congestion, sore throat, difficulty breathing or swallowing. No chest pain or shortness of breath. Has been afebrile without recent travel or sick contacts. Patient denies all other symptoms at this time. Review of Systems:   A complete review of systems was performed and pertinent positives and negatives are stated within HPI, all other systems reviewed and are negative.          --------------------------------------------- PAST HISTORY ---------------------------------------------  Past Medical History:  has no past medical history on file. Past Surgical History:  has a past surgical history that includes Ankle fracture surgery (Left, 4/19/2019). Social History:  reports that he has been smoking cigarettes. He has been smoking about 0.25 packs per day. He quit smokeless tobacco use about 2 months ago. His smokeless tobacco use included chew. He reports current alcohol use. He reports that he does not use drugs. Family History: family history is not on file. The patients home medications have been reviewed. Allergies: Pcn [penicillins]    -------------------------------------------------- RESULTS -------------------------------------------------  All laboratory and radiology results have been personally reviewed by myself   LABS:  No results found for this visit on 06/17/22.     RADIOLOGY:  Interpreted by Radiologist.  No orders to display       ------------------------- NURSING NOTES AND VITALS REVIEWED ---------------------------   The nursing notes within the ED encounter and vital signs as below have been reviewed. BP (!) 152/94   Pulse 90   Temp 98.7 °F (37.1 °C) (Infrared)   Resp 18   Ht 5' 7\" (1.702 m)   Wt 204 lb (92.5 kg)   SpO2 99%   BMI 31.95 kg/m²   Oxygen Saturation Interpretation: Normal      ---------------------------------------------------PHYSICAL EXAM--------------------------------------      Constitutional/General: Alert and oriented x3, well appearing, non toxic in NAD  Head: Normocephalic and atraumatic. Left TM without erythema however there is air-fluid level behind TM with mild bulging. No evidence of perforation. Right TM within normal limits. External auditory canals pink and dry bilaterally. Eyes: PERRL, EOMI  Mouth: Oropharynx clear, handling secretions, no trismus. No pharyngeal erythema, peritonsillar swelling, or tonsillar exudates. Neck: Supple, full ROM, ,   Extremities: Moves all extremities x 4. Warm and well perfused  Skin: warm and dry without rash  Neurologic: GCS 15,  Psych: Normal Affect      ------------------------------ ED COURSE/MEDICAL DECISION MAKING----------------------  Medications - No data to display      ED COURSE:       Medical Decision Making:    Patient is a 78-year-old male presenting to urgent care today with left-sided otitis. No evidence of infectious process however ear effusion is present. He is nontoxic-appearing, afebrile, no acute distress therefore we will plan on outpatient symptom management with anti-inflammatories as well as decongestants. Advised to follow-up very closely with PCP for recheck and return the emergency department with any new or worsening symptoms. Patient voiced understanding is agreeable to the above treatment plan. Counseling:    The emergency provider has spoken with the patient and discussed todays results, in addition to providing specific details for the plan of care and counseling regarding the diagnosis and prognosis. Questions are answered at this time and they are agreeable with the plan.      --------------------------------- IMPRESSION AND DISPOSITION ---------------------------------    IMPRESSION  1. Non-recurrent acute serous otitis media of left ear        DISPOSITION  Disposition: Discharge to home  Patient condition is stable      NOTE: This report was transcribed using voice recognition software.  Every effort was made to ensure accuracy; however, inadvertent computerized transcription errors may be present        Riddhi Cassidyma  06/17/22 1115

## 2023-08-18 ENCOUNTER — HOSPITAL ENCOUNTER (EMERGENCY)
Age: 28
Discharge: HOME | End: 2023-08-18
Payer: COMMERCIAL

## 2023-08-18 VITALS
WEIGHT: 203 LBS | SYSTOLIC BLOOD PRESSURE: 146 MMHG | DIASTOLIC BLOOD PRESSURE: 99 MMHG | HEIGHT: 67 IN | TEMPERATURE: 98.4 F | HEART RATE: 98 BPM | RESPIRATION RATE: 20 BRPM | BODY MASS INDEX: 31.86 KG/M2

## 2023-08-18 DIAGNOSIS — J01.90 ACUTE SINUSITIS, RECURRENCE NOT SPECIFIED, UNSPECIFIED LOCATION: Primary | ICD-10-CM

## 2023-08-18 PROCEDURE — 99211 OFF/OP EST MAY X REQ PHY/QHP: CPT

## 2023-08-18 RX ORDER — AZITHROMYCIN 250 MG/1
TABLET, FILM COATED ORAL
Qty: 6 TABLET | Refills: 0 | Status: SHIPPED | OUTPATIENT
Start: 2023-08-18

## 2023-08-18 ASSESSMENT — PAIN - FUNCTIONAL ASSESSMENT: PAIN_FUNCTIONAL_ASSESSMENT: NONE - DENIES PAIN

## 2025-02-14 ENCOUNTER — APPOINTMENT (OUTPATIENT)
Dept: GENERAL RADIOLOGY | Age: 30
End: 2025-02-14
Payer: COMMERCIAL

## 2025-02-14 ENCOUNTER — HOSPITAL ENCOUNTER (EMERGENCY)
Age: 30
Discharge: HOME | End: 2025-02-14
Attending: EMERGENCY MEDICINE
Payer: COMMERCIAL

## 2025-02-14 VITALS
HEART RATE: 124 BPM | TEMPERATURE: 98.1 F | SYSTOLIC BLOOD PRESSURE: 142 MMHG | RESPIRATION RATE: 16 BRPM | DIASTOLIC BLOOD PRESSURE: 101 MMHG | OXYGEN SATURATION: 98 %

## 2025-02-14 DIAGNOSIS — S82.891A CLOSED FRACTURE OF RIGHT ANKLE, INITIAL ENCOUNTER: Primary | ICD-10-CM

## 2025-02-14 PROCEDURE — 99284 EMERGENCY DEPT VISIT MOD MDM: CPT

## 2025-02-14 PROCEDURE — 6360000002 HC RX W HCPCS

## 2025-02-14 PROCEDURE — 6370000000 HC RX 637 (ALT 250 FOR IP): Performed by: EMERGENCY MEDICINE

## 2025-02-14 PROCEDURE — 27788 TREATMENT OF ANKLE FRACTURE: CPT

## 2025-02-14 PROCEDURE — 73590 X-RAY EXAM OF LOWER LEG: CPT

## 2025-02-14 PROCEDURE — 73600 X-RAY EXAM OF ANKLE: CPT

## 2025-02-14 PROCEDURE — 73610 X-RAY EXAM OF ANKLE: CPT

## 2025-02-14 RX ORDER — IBUPROFEN 800 MG/1
800 TABLET, FILM COATED ORAL ONCE
Status: COMPLETED | OUTPATIENT
Start: 2025-02-14 | End: 2025-02-14

## 2025-02-14 RX ORDER — LIDOCAINE HYDROCHLORIDE 10 MG/ML
20 INJECTION, SOLUTION INFILTRATION; PERINEURAL ONCE
Status: COMPLETED | OUTPATIENT
Start: 2025-02-14 | End: 2025-02-14

## 2025-02-14 RX ORDER — MIDAZOLAM HYDROCHLORIDE 1 MG/ML
2 INJECTION, SOLUTION INTRAMUSCULAR; INTRAVENOUS ONCE
Status: COMPLETED | OUTPATIENT
Start: 2025-02-14 | End: 2025-02-14

## 2025-02-14 RX ORDER — HYDROCODONE BITARTRATE AND ACETAMINOPHEN 5; 325 MG/1; MG/1
1 TABLET ORAL 3 TIMES DAILY PRN
Qty: 14 TABLET | Refills: 0 | Status: SHIPPED | OUTPATIENT
Start: 2025-02-14 | End: 2025-02-19

## 2025-02-14 RX ORDER — FENTANYL CITRATE 50 UG/ML
100 INJECTION, SOLUTION INTRAMUSCULAR; INTRAVENOUS ONCE
Status: COMPLETED | OUTPATIENT
Start: 2025-02-14 | End: 2025-02-14

## 2025-02-14 RX ADMIN — MIDAZOLAM 2 MG: 1 INJECTION INTRAMUSCULAR; INTRAVENOUS at 12:22

## 2025-02-14 RX ADMIN — FENTANYL CITRATE 100 MCG: 50 INJECTION INTRAMUSCULAR; INTRAVENOUS at 12:22

## 2025-02-14 RX ADMIN — LIDOCAINE HYDROCHLORIDE 20 ML: 10 INJECTION, SOLUTION INFILTRATION; PERINEURAL at 12:22

## 2025-02-14 RX ADMIN — IBUPROFEN 800 MG: 800 TABLET, FILM COATED ORAL at 07:16

## 2025-02-14 NOTE — CONSULTS
Department of Orthopedic Surgery  Resident Consult Note          Reason for Consult: Fall, right ankle pain    HISTORY OF PRESENT ILLNESS:       Patient is a 29 y.o. male who presents with right ankle pain following a fall that occurred last night.  Patient states he went out to his truck he discharger and slipped and fell and twisted his ankle.  Patient is he was unable to ambulate following incident.  Patient denies hitting his head or loss consciousness.  Denies numbness/tingling/paresthesias.  Denies any other orthopedic complaints at this time.      Patient is status post left ankle ORIF done approximately 5 years ago at [facility].    Past Medical History:    History reviewed. No pertinent past medical history.  Past Surgical History:        Procedure Laterality Date    ANKLE FRACTURE SURGERY Left 4/19/2019    LEFT ANKLE OPEN REDUCTION INTERNAL FIXATION WITH POSSIBLE SYNDESMOSIS REPAIR performed by Eduardo Weber DO at [facility] [place]     Current Medications:   No current facility-administered medications for this encounter.  Allergies:  Pcn [penicillins]    Social History:   TOBACCO:   reports that he has been smoking cigarettes. He quit smokeless tobacco use about 2 years ago.  His smokeless tobacco use included chew.  ETOH:   reports current alcohol use.  DRUGS:   reports no history of drug use.    Family History:   History reviewed. No pertinent family history.    REVIEW OF SYSTEMS:  CONSTITUTIONAL:  negative for  fevers, chills  EYES:  negative for blurred vision, visual disturbance  HEENT:  negative for  hearing loss, voice change  RESPIRATORY:  negative for  dyspnea, wheezing  CARDIOVASCULAR:  negative for  chest pain, palpitations  GASTROINTESTINAL:  negative for nausea, vomiting  GENITOURINARY:  negative for frequency, urinary incontinence  HEMATOLOGIC/LYMPHATIC:  negative for bleeding and petechiae  MUSCULOSKELETAL:  positive for  pain  NEUROLOGICAL:  negative for headaches, dizziness  BEHAVIOR/PSYCH:

## 2025-02-14 NOTE — ED PROVIDER NOTES
[facility] EMERGENCY DEPARTMENT  EMERGENCY DEPARTMENT ENCOUNTER        Pt Name: Maged Mccauley  MRN: [id number]  Birthdate 1995  Date of evaluation: 2/14/2025  Provider: Sam Davila DO  PCP: No primary care provider on file.  Note Started: 6:34 AM EST 2/14/25    CHIEF COMPLAINT       Chief Complaint   Patient presents with    Fall     States he fell on the ice last night and when he woke up this am ankle was hurting, came in by EMS, to intake.        HISTORY OF PRESENT ILLNESS: 1 or more Elements   History From: patient    Limitations to history : None    Maged Mccauley is a 29 y.o. male who presents to the ED for evaluation of right ankle pain.  Patient states that last night he slipped while walking on ice and he felt his foot bent forward.  He now has pain behind his right ankle.  Able to bear some weight on it but it is painful.  Denies any numbness or weakness in the right foot.  Denies any neck or back pain.  Did not hit his head.  There was no chest pain, shortness of breath, or dizziness preceding the fall.  Patient arrives by EMS for further evaluation    Nursing Notes were all reviewed and agreed with or any disagreements were addressed in the HPI.      REVIEW OF EXTERNAL NOTE :        REVIEW OF SYSTEMS :           Positives and Pertinent negatives as per HPI.     SURGICAL HISTORY     Past Surgical History:   Procedure Laterality Date    ANKLE FRACTURE SURGERY Left 4/19/2019    LEFT ANKLE OPEN REDUCTION INTERNAL FIXATION WITH POSSIBLE SYNDESMOSIS REPAIR performed by Eduardo Weber DO at [facility] [place]       CURRENTMEDICATIONS       Previous Medications    APPLE CIDER VINEGAR PO    Take by mouth    AZITHROMYCIN (ZITHROMAX Z-SHAYLA) 250 MG TABLET    Take 2 tabs on day one, followed by 1 tablet daily for 4 days.    IBUPROFEN (ADVIL;MOTRIN) 200 MG TABLET    Take 400 mg by mouth every 6 hours as needed for Pain    MULTIPLE VITAMINS-MINERALS (THERAPEUTIC MULTIVITAMIN-MINERALS)

## 2025-02-14 NOTE — DISCHARGE INSTRUCTIONS
Orthopedic discharge instructions:  Nonweightbearing to right lower extremity  Keep splint in place, clean dry and intact  Pain control per ED  Call as soon as possible for a follow-up appointment with Dr. Sandoval

## (undated) DEVICE — TOTAL KNEE PK

## (undated) DEVICE — SHEET,DRAPE,53X77,STERILE: Brand: MEDLINE

## (undated) DEVICE — PATIENT RETURN ELECTRODE, SINGLE-USE, CONTACT QUALITY MONITORING, ADULT, WITH 9FT CORD, FOR PATIENTS WEIGING OVER 33LBS. (15KG): Brand: MEGADYNE

## (undated) DEVICE — DRIP REDUCTION MANIFOLD

## (undated) DEVICE — DRAPE C ARM W41XL74IN UNIV MOB W RUBBERBAND CLP

## (undated) DEVICE — INTENDED FOR TISSUE SEPARATION, AND OTHER PROCEDURES THAT REQUIRE A SHARP SURGICAL BLADE TO PUNCTURE OR CUT.: Brand: BARD-PARKER ® STAINLESS STEEL BLADES

## (undated) DEVICE — GLOVE ORANGE PI 8   MSG9080

## (undated) DEVICE — SURGICAL PROCEDURE PACK BASIC

## (undated) DEVICE — SET ORTHO STD STORTSTD1

## (undated) DEVICE — DRAPE,REIN 53X77,STERILE: Brand: MEDLINE

## (undated) DEVICE — DRILL SYSTEM 7

## (undated) DEVICE — ZIMMER® STERILE DISPOSABLE TOURNIQUET CUFF WITH PROTECTIVE SLEEVE AND PLC, DUAL PORT, SINGLE BLADDER, 34 IN. (86 CM)

## (undated) DEVICE — CONTROL SYRINGE LUER-LOCK TIP: Brand: MONOJECT

## (undated) DEVICE — BIT DRL L125MM DIA2MM S STL QUIK CPL FOR LOK COMPR PLT

## (undated) DEVICE — SPLINT ORTH W5XL30IN PLSTR OF PARIS FAST IMMOB OF FRAC HI

## (undated) DEVICE — DRESSING,GAUZE,XEROFORM,CURAD,5"X9",ST: Brand: CURAD

## (undated) DEVICE — GOWN,BREATHABLE SLV,AURORA,XLG,STRL: Brand: MEDLINE

## (undated) DEVICE — BANDAGE COMPR W4INXL10YD WHITE/BEIGE E MTRX HK LOOP CLSR

## (undated) DEVICE — PADDING,UNDERCAST,COTTON, 4"X4YD STERILE: Brand: MEDLINE

## (undated) DEVICE — TUBING SUCT 12FR MAL ALUM SHFT FN CAP VENT UNIV CONN W/ OBT

## (undated) DEVICE — BANDAGE COMPR W6INXL12FT SMOOTH FOR LIMB EXSANG ESMARCH

## (undated) DEVICE — BIT DRL 3 FLUT 2.7X125 MM QC SS STRL LCP

## (undated) DEVICE — Z CONVERTED USE 2275207 CLOTH PREP W7.5XL7.5IN 2% CHG SKIN ALC AND RNS FREE

## (undated) DEVICE — Z DISCONTINUED USE 2275686 GLOVE SURG SZ 8 L12IN FNGR THK13MIL WHT ISOLEX POLYISOPRENE

## (undated) DEVICE — 3M™ STERI-DRAPE™ U-DRAPE 1015: Brand: STERI-DRAPE™

## (undated) DEVICE — CHLORAPREP 26ML ORANGE

## (undated) DEVICE — BIT DRL L110MM DIA2.5MM ST G QUIK CPL NONRADIOPAQUE W/O STP

## (undated) DEVICE — GAUZE,SPONGE,AVANT,4"X4",4PLY,STRL,10/TR: Brand: MEDLINE

## (undated) DEVICE — SET ORTHO STD STORTSTD2